# Patient Record
Sex: FEMALE | Race: WHITE | NOT HISPANIC OR LATINO | Employment: OTHER | URBAN - METROPOLITAN AREA
[De-identification: names, ages, dates, MRNs, and addresses within clinical notes are randomized per-mention and may not be internally consistent; named-entity substitution may affect disease eponyms.]

---

## 2017-01-05 ENCOUNTER — OFFICE VISIT (OUTPATIENT)
Dept: FAMILY MEDICINE | Facility: CLINIC | Age: 82
End: 2017-01-05

## 2017-01-05 VITALS
RESPIRATION RATE: 12 BRPM | OXYGEN SATURATION: 96 % | WEIGHT: 121 LBS | SYSTOLIC BLOOD PRESSURE: 122 MMHG | BODY MASS INDEX: 22.84 KG/M2 | TEMPERATURE: 97.3 F | DIASTOLIC BLOOD PRESSURE: 78 MMHG | HEART RATE: 91 BPM | HEIGHT: 61 IN

## 2017-01-05 DIAGNOSIS — Z23 NEEDS FLU SHOT: ICD-10-CM

## 2017-01-05 DIAGNOSIS — J20.9 ACUTE BRONCHITIS WITH SYMPTOMS > 10 DAYS: Primary | ICD-10-CM

## 2017-01-05 RX ORDER — AZITHROMYCIN 250 MG/1
TABLET, FILM COATED ORAL
Qty: 6 TABLET | Refills: 0 | Status: SHIPPED | OUTPATIENT
Start: 2017-01-05 | End: 2017-07-19

## 2017-01-05 NOTE — PROGRESS NOTES
"SUBJECTIVE:  1. Feeling ill:  -body aches over last 2 weeks  -just in last couple day had knees hurt  -coughing up lots of phlegm, hoarse voice last couple of days      ROS:  Possibly got ill exposure from family member, daughter was treated with abx. URI  Runny nose  Dry throat, coughing  Denies vomiting, does have diarrhea (painless and no blood)    OBJECTIVE:  /78 mmHg  Pulse 91  Temp(Src) 97.3  F (36.3  C) (Oral)  Resp 12  Ht 5' 0.5\" (153.7 cm)  Wt 121 lb (54.885 kg)  BMI 23.23 kg/m2  SpO2 96%  Gen: alert, oriented X 3, no acute distress, moving slightly slower, appears tired.  HEENT: TMs normal bilaterally, gray, normal light reflex, no significant cerumen, OP without erythema, no cervical lymphadenopathy  LUNGS: CTAB, no wheezing, no rales, no accessory muscle use, slightly coarse cough  COR: normal rate, regular rhythm and no murmurs, clicks, or gallops  -lower extremities : no edema, diffuse muscle pain         ASSESSMENT/PLAN:  1. Acute bronchitis with symptoms > 10 days  Given duration and age will add abx.  Discussed symptomatic treatment options and use of tylenol/advil  - azithromycin (ZITHROMAX) 250 MG tablet; Two tablets first day, then one tablet daily for four days.  Dispense: 6 tablet; Refill: 0    2. Needs flu shot  Agree with dosing now.  - ADMIN VACCINE, INITIAL  - Flu vaccine, high dose  .    "

## 2017-01-05 NOTE — MR AVS SNAPSHOT
"              After Visit Summary   2017    Marge Henry    MRN: [id number]           Patient Information     Date Of Birth          12/10/1934        Visit Information        Provider Department      2017 4:40 PM Ursula Quintanilla MD [facility]        Today's Diagnoses     Acute bronchitis with symptoms > 10 days    -  1        Follow-ups after your visit        Who to contact     Please call your clinic at [phone] to:    Ask questions about your health    Make or cancel appointments    Discuss your medicines    Learn about your test results    Speak to your doctor   If you have compliments or concerns about an experience at your clinic, or if you wish to file a complaint, please contact [facility] Patient Relations at [phone] or email us at [email or web address]         Additional Information About Your Visit        MyChart Information     [employer] is an electronic gateway that provides easy, online access to your medical records. With [employer], you can request a clinic appointment, read your test results, renew a prescription or communicate with your care team.     To sign up for [employer] visit the website at [email or web address]   You will be asked to enter the access code listed below, as well as some personal information. Please follow the directions to create your username and password.     Your access code is: [email or web address]  Expires: 2017  5:30 PM     Your access code will  in 90 days. If you need help or a new code, please contact your [facility] or call [phone] for assistance.        Care EveryWhere ID     This is your Care EveryWhere ID. This could be used by other organizations to access your [facility] medical records  [id number]        Your Vitals Were     Pulse Temperature Respirations Height BMI (Body Mass Index) Pulse Oximetry    91 97.3  F (36.3  C) (Oral) 12 5' 0.5\" (153.7 cm) " 23.23 kg/m2 96%       Blood Pressure from Last 3 Encounters:   01/05/17 122/78   03/02/16 154/85   10/30/15 132/81    Weight from Last 3 Encounters:   01/05/17 121 lb (54.885 kg)   03/02/16 118 lb 3.2 oz (53.615 kg)   10/30/15 122 lb 9.6 oz (55.611 kg)              Today, you had the following     No orders found for display         Today's Medication Changes          These changes are accurate as of: 1/5/17  5:30 PM.  If you have any questions, ask your nurse or doctor.               Start taking these medicines.        Dose/Directions    azithromycin 250 MG tablet   Commonly known as:  ZITHROMAX   Used for:  Acute bronchitis with symptoms > 10 days   Started by:  Ursula Quintanilla MD        Two tablets first day, then one tablet daily for four days.   Quantity:  6 tablet   Refills:  0            Where to get your medicines      These medications were sent to [id number] - [place], [place] - [address] AT [facility] & [address], [place] [address]     Phone:  [phone]    - azithromycin 250 MG tablet             Primary Care Provider Office Phone # Fax #    Ursula Quintanilla [phone]       [facility] [address] [place] [id number]        Thank you!     Thank you for choosing [facility]  for your care. Our goal is always to provide you with excellent care. Hearing back from our patients is one way we can continue to improve our services. Please take a few minutes to complete the written survey that you may receive in the mail after your visit with us. Thank you!             Your Updated Medication List - Protect others around you: Learn how to safely use, store and throw away your medicines at www.disposemymeds.org.          This list is accurate as of: 1/5/17  5:30 PM.  Always use your most recent med list.                   Brand Name Dispense Instructions for use    ACETAMINOPHEN      2 tablets. In  the morning and before bed as needed.       ALPRAZolam 0.25 MG tablet    XANAX    4 tablet    Take 1 tablet (0.25 mg) by mouth once as needed for anxiety (30 minutes before appointment)       aspirin 81 MG tablet      Take  by mouth daily.       azithromycin 250 MG tablet    ZITHROMAX    6 tablet    Two tablets first day, then one tablet daily for four days.       CALTRATE 600 PO          CENTRUM SILVER per tablet      Take 1 tablet by mouth daily       COSOPT PF 22.3-6.8 MG/ML Soln   Generic drug:  Dorzolamide HCl-Timolol Mal PF      Apply  to eye.       gabapentin 400 MG capsule    NEURONTIN    270 capsule    Take 1-2 capsules (400-800 mg) by mouth 2 times daily       lidocaine 5 % Patch    LIDODERM    60 patch    Apply up to 2 patches to painful area at once for up to 12 h within a 24 h period.  Remove after 12 hours.       lisinopril 20 MG tablet    PRINIVIL/ZESTRIL    90 tablet    Take 1 tablet (20 mg) by mouth daily       loperamide 2 MG tablet    IMODIUM A-D     Take 2 mg by mouth 4 times daily as needed.       XALATAN 0.005 % ophthalmic solution   Generic drug:  latanoprost      1 drop At Bedtime.

## 2017-01-17 ENCOUNTER — TELEPHONE (OUTPATIENT)
Dept: FAMILY MEDICINE | Facility: CLINIC | Age: 82
End: 2017-01-17

## 2017-01-17 NOTE — TELEPHONE ENCOUNTER
Spoke with Angela at [facility] of [facility]. Medication was not cover this month due to changes in insurance. Patient had paid cash ($155.29) for medication for January.       Prior Authorization needed on:  1/17/17     Medication:  GABAPENTIN Dose:  400 MG CAPSULE    SIG: TAKE 1-2 CAPSULES BY MOUTH TWICE DAILY    Pharmacy confirmed as   [id number] - [place], [place] - [address] AT [address] [place] [address]  Phone: [phone] Fax: [phone]  : Yes    Insurance Name:  [employer]  Insurance Phone: [phone]  Insurance Patient ID: [id number]    Alternatives Suggested:  NONE PROVIDED.    Berta Arias January 17, 2017 at 11:22 AM

## 2017-01-23 NOTE — TELEPHONE ENCOUNTER
Received response back from [facility] Jey BENITEZ and Appeals Dept - Appeal not handled by [facility].  Prior auth request only, not an appeal. Unsure if patient has an existing prior auth on file.  Spoke with EMMANUEL Zhou handled by a different department. Not a medicare patient.  Form changed. Need to complete and resubmit to insurance. Message forward to provider to complete.

## 2017-02-06 NOTE — TELEPHONE ENCOUNTER
Again, received information that PA is not handled by [facility].  I called and spoke with Insurance and prior auth is handled by medicare part d delta. Transferred call. [phone])  I spoke with Brenda and asked her to send prior auth form as this has been taken a lot of time trying to get prior auth completed for patient.  Per Brenda, had ran a test claim for 270 tabs per 30 days, no prior auth required. Unsure why I received prior auth request in the first place.  Nothing further needed.

## 2017-05-19 DIAGNOSIS — B02.29 NEURALGIA, POSTHERPETIC: ICD-10-CM

## 2017-05-19 RX ORDER — GABAPENTIN 400 MG/1
400-800 CAPSULE ORAL 2 TIMES DAILY
Qty: 270 CAPSULE | Refills: 3 | Status: SHIPPED | OUTPATIENT
Start: 2017-05-19 | End: 2017-07-19

## 2017-06-20 DIAGNOSIS — I10 ESSENTIAL HYPERTENSION, BENIGN: ICD-10-CM

## 2017-06-20 RX ORDER — LISINOPRIL 20 MG/1
20 TABLET ORAL DAILY
Qty: 90 TABLET | Refills: 3 | Status: SHIPPED | OUTPATIENT
Start: 2017-06-20 | End: 2018-06-11

## 2017-07-19 ENCOUNTER — OFFICE VISIT (OUTPATIENT)
Dept: FAMILY MEDICINE | Facility: CLINIC | Age: 82
End: 2017-07-19

## 2017-07-19 VITALS
TEMPERATURE: 97.6 F | BODY MASS INDEX: 24.26 KG/M2 | DIASTOLIC BLOOD PRESSURE: 79 MMHG | SYSTOLIC BLOOD PRESSURE: 136 MMHG | OXYGEN SATURATION: 96 % | RESPIRATION RATE: 16 BRPM | HEART RATE: 78 BPM | WEIGHT: 123.6 LBS | HEIGHT: 60 IN

## 2017-07-19 DIAGNOSIS — Z00.00 ROUTINE GENERAL MEDICAL EXAMINATION AT A HEALTH CARE FACILITY: Primary | ICD-10-CM

## 2017-07-19 DIAGNOSIS — Z23 IMMUNIZATION DUE: ICD-10-CM

## 2017-07-19 DIAGNOSIS — H61.22 IMPACTED CERUMEN OF LEFT EAR: ICD-10-CM

## 2017-07-19 DIAGNOSIS — B02.29 NEURALGIA, POSTHERPETIC: ICD-10-CM

## 2017-07-19 RX ORDER — GABAPENTIN 400 MG/1
400-800 CAPSULE ORAL 2 TIMES DAILY
Qty: 120 CAPSULE | Refills: 3 | Status: SHIPPED | OUTPATIENT
Start: 2017-07-19 | End: 2017-12-21

## 2017-07-19 NOTE — NURSING NOTE
DUE FOR:  Advance Directive Planning  Dexa Scan Screening  Tdap - may need to get at pharmacy due to insurance. - Pt wants to get this in clinic only and will pay for it if not cover by insurance.  PCV13 - pt okay for this today.    ABN signed today for Tdap per Medicare may not pay for vaccination done in clinic.

## 2017-07-19 NOTE — PROGRESS NOTES
Patient presents with:  Physical  Ear Problem: would like bilateral ears cleaned, having some hard time hearing in left ear. Seeing alot of ear wax from cleaning per daughter.  head problem: pt would like to know why sometimes when she turns her head, she gets dizzy,.  Refill Request: medication for shingles? 120 mg tab per pt    1.  One fall 6 months ago:  -turned fast and lost balance, now have a med alert  Bracelet  -wonders anything else for balance      ROS:  CONSTITUTIONAL: no fatigue, no unexpected change in weight  SKIN: no worrisome rashes, no worrisome moles, no worrisome lesions  EYES: no acute vision problems or changes  ENT: no ear problems, no mouth problems, no throat problems  RESP: no significant cough, no shortness of breath  CV: no chest pain, no palpitations, no new or worsening peripheral edema  GI: chronic unchanged loose bowels  : no frequency, no dysuria, no hematuria  NEURO: some balance issues see aboe  PSYCHIATRIC: NEGATIVE for changes in mood or affect      Patient Active Problem List   Diagnosis     Primary angle-closure glaucoma     Essential hypertension, benign     Irritable bowel syndrome     Osteoarthrosis, unspecified whether generalized or localized, pelvic region and thigh     Osteoarthrosis, unspecified whether generalized or localized, involving lower leg     Health Care Home     Shingles     Postherpetic neuralgia       Current Outpatient Prescriptions   Medication Sig Dispense Refill     lisinopril (PRINIVIL/ZESTRIL) 20 MG tablet Take 1 tablet (20 mg) by mouth daily 90 tablet 3     gabapentin (NEURONTIN) 400 MG capsule Take 1-2 capsules (400-800 mg) by mouth 2 times daily 270 capsule 3     ACETAMINOPHEN 2 tablets. In the morning and before bed as needed.       aspirin 81 MG tablet Take  by mouth daily.       Dorzolamide HCl-Timolol Mal PF (COSOPT PF) 22.3-6.8 MG/ML SOLN Apply  to eye.       latanoprost (XALATAN) 0.005 % ophthalmic solution 1 drop At Bedtime.        azithromycin (ZITHROMAX) 250 MG tablet Two tablets first day, then one tablet daily for four days. 6 tablet 0     lidocaine (LIDODERM) 5 % patch Apply up to 2 patches to painful area at once for up to 12 h within a 24 h period.  Remove after 12 hours. 60 patch 11     ALPRAZolam (XANAX) 0.25 MG tablet Take 1 tablet (0.25 mg) by mouth once as needed for anxiety (30 minutes before appointment) 4 tablet 1     Calcium Carbonate (CALTRATE 600 PO)        Multiple Vitamins-Minerals (CENTRUM SILVER) per tablet Take 1 tablet by mouth daily       loperamide (IMODIUM A-D) 2 MG tablet Take 2 mg by mouth 4 times daily as needed.         Past Medical History:   Diagnosis Date     Essential hypertension, benign 1/2/2013     Irritable bowel syndrome 1/2/2013     Osteoarthrosis, unspecified whether generalized or localized, pelvic region and thigh 1/2/2013     Prim angle-clos glauc 1/2/2013        Family History        Negative family history of: CANCER, DIABETES, HEART DISEASE, Coronary Artery Disease          Problem List Medication List and Allergy List were reviewed.    Patient is an established patient of this clinic..    Social History   Substance Use Topics     Smoking status: Former Smoker     Smokeless tobacco: Never Used     Alcohol use 0.0 oz/week     0 Standard drinks or equivalent per week      Comment: 1-2 cans of beer/Wine Occasionally       Children ? yes 3    Has anyone hurt you physically, for example by pushing, hitting, slapping or kicking you or forcing you to have sex? Denies  Do you feel threatened or controlled by a partner, ex-partner or anyone in your life? Denies    RISK BEHAVIORS AND HEALTHY HABITS:  Tobacco Use/Smoking: None  Illicit Drug Use: None  Do you use alcohol? Yes has a beer daily sometimes  Diet (5-7 servings of fruits/veg daily): Yes   Exercise (30 min accumulated most days):discussed balance exercises  Dental Care: Yes   Calcium 1500 mg/d:  Yes  Seat Belt Use: Yes         Immunization  History   Administered Date(s) Administered     Influenza (High Dose) 3 valent vaccine 01/05/2017     Influenza (IIV3) 02/07/2011     Pneumococcal 23 valent 04/18/2006     Tdap (Adacel,Boostrix) 04/18/2006    Reviewed Immunization Record Today    EXAMINATION:   /79 (BP Location: Right arm)  Pulse 78  Temp 97.6  F (36.4  C) (Oral)  Resp 16  Ht 5' (152.4 cm)  Wt 123 lb 9.6 oz (56.1 kg)  SpO2 96%  BMI 24.14 kg/m2  GENERAL: healthy, alert and no distress  EYES: Eyes grossly normal to inspection, extraocular movements - intact, and PERRL  HENT: ear canals- normal; TMs- normal; Nose- normal; Mouth- no ulcers, no lesions  NECK: no tenderness, no adenopathy, no asymmetry, no masses, no stiffness; thyroid- normal to palpation  RESP: lungs clear to auscultation - no rales, no rhonchi, no wheezes  BREAST: no masses, no tenderness, no nipple discharge, no palpable axillary masses or adenopathy  CV: regular rates and rhythm, normal S1 S2, no S3 or S4 and no murmur, no click or rub -  ABDOMEN: soft, no tenderness, no  hepatosplenomegaly, no masses, normal bowel sounds  MS: extremities- no gross deformities noted, no edema  SKIN: no suspicious lesions, no rashes  NEURO: strength and tone- normal, sensory exam- grossly normal, mentation- intact, speech- normal, reflexes- symmetric  BACK: no CVA tenderness, no paralumbar tenderness  PSYCH: Alert and oriented times 3; speech- coherent , normal rate and volume; able to articulate logical thoughts, able to abstract reason, no tangential thoughts, no hallucinations or delusions, affect- normal  LYMPHATICS: ant. cervical- normal, post. cervical- normal, axillary- normal, supraclavicular- normal, inguinal- normal    ASSESSMENT:  1. Health Care Maintenance: Normal Physical Exam    PLAN:  (Z00.00) Routine general medical examination at a health care facility  (primary encounter diagnosis)  Comment: continue current healthy habits  Plan: encourage ongoing home safety    (B02.29)  Neuralgia, postherpetic  Comment: stable, improving after many years  Plan: gabapentin (NEURONTIN) 400 MG capsule        Taking as needed    (Z23) Immunization due  Comment: agrees to vaccine  Plan: ADMIN VACCINE, INITIAL, ADMIN VACCINE, EACH         ADDITIONAL, TDAP VACCINE (BOOSTRIX),         Pneumococcal vaccine 13 valent PCV13 IM         (Prevnar) [74405]

## 2017-07-19 NOTE — PATIENT INSTRUCTIONS
Everyday work on your balance exercises as shown in clinic.      Your medication list is printed, please keep this with you, it is helpful to bring this current list to any other medical appointments, the emergency room or hospital.    If you had lab testing today and your results are reassuring or normal they will be be mailed to you within 7 days.     If the lab tests need quick action we will call you with the results.   The phone number we will call with results is # [phone] (home) . If this is not the best number please call our clinic and change the number.    If you need any refills please call your pharmacy and they will contact us.    If you have any further concerns or wish to schedule another appointment you must call our office during normal business hours  [phone] (8-5:00 M-F)  If you have urgent medical questions that cannot wait  you may also call [phone] at any time of day.  If you have a medical emergency please call 911.    Thank you for coming to [facility].      Preventive Health Recommendations  Female Ages 65 +    Yearly exam:     See your health care provider every year in order to  o Review health changes.   o Discuss preventive care.    o Review your medicines if your doctor has prescribed any.      You no longer need a yearly Pap test unless you've had an abnormal Pap test in the past 10 years. If you have vaginal symptoms, such as bleeding or discharge, be sure to talk with your provider about a Pap test.      Every 1 to 2 years, have a mammogram.  If you are over 69, talk with your health care provider about whether or not you want to continue having screening mammograms.      Every 10 years, have a colonoscopy. Or, have a yearly FIT test (stool test). These exams will check for colon cancer.       Have a cholesterol test every 5 years, or more often if your doctor advises it.       Have a diabetes test (fasting glucose) every three years. If you are at risk for  diabetes, you should have this test more often.       At age 65, have a bone density scan (DEXA) to check for osteoporosis (brittle bone disease).    Shots:    Get a flu shot each year.    Get a tetanus shot every 10 years.    Talk to your doctor about your pneumonia vaccines. There are now two you should receive - Pneumovax (PPSV 23) and Prevnar (PCV 13).    Talk to your doctor about the shingles vaccine.    Talk to your doctor about the hepatitis B vaccine.    Nutrition:     Eat at least 5 servings of fruits and vegetables each day.      Eat whole-grain bread, whole-wheat pasta and brown rice instead of white grains and rice.      Talk to your provider about Calcium and Vitamin D.     Lifestyle    Exercise at least 150 minutes a week (30 minutes a day, 5 days a week). This will help you control your weight and prevent disease.      Limit alcohol to one drink per day.      No smoking.       Wear sunscreen to prevent skin cancer.       See your dentist twice a year for an exam and cleaning.      See your eye doctor every 1 to 2 years to screen for conditions such as glaucoma, macular degeneration, cataracts, etc

## 2017-07-19 NOTE — MR AVS SNAPSHOT
After Visit Summary   7/19/2017    Marge Henry    MRN: [id number]           Patient Information     Date Of Birth          12/10/1934        Visit Information        Provider Department      7/19/2017 1:00 PM Ursula Quintanilla MD [facility]        Today's Diagnoses     Routine general medical examination at a health care facility    -  1    Neuralgia, postherpetic        Immunization due          Care Instructions    Everyday work on your balance exercises as shown in clinic.      Your medication list is printed, please keep this with you, it is helpful to bring this current list to any other medical appointments, the emergency room or hospital.    If you had lab testing today and your results are reassuring or normal they will be be mailed to you within 7 days.     If the lab tests need quick action we will call you with the results.   The phone number we will call with results is # [phone] (home) . If this is not the best number please call our clinic and change the number.    If you need any refills please call your pharmacy and they will contact us.    If you have any further concerns or wish to schedule another appointment you must call our office during normal business hours  [phone] (8-5:00 M-F)  If you have urgent medical questions that cannot wait  you may also call [phone] at any time of day.  If you have a medical emergency please call [phone].    Thank you for coming to [facility].      Preventive Health Recommendations  Female Ages 65 +    Yearly exam:     See your health care provider every year in order to  o Review health changes.   o Discuss preventive care.    o Review your medicines if your doctor has prescribed any.      You no longer need a yearly Pap test unless you've had an abnormal Pap test in the past 10 years. If you have vaginal symptoms, such as bleeding or discharge, be sure to talk with your provider about a Pap test.      Every 1  to 2 years, have a mammogram.  If you are over 69, talk with your health care provider about whether or not you want to continue having screening mammograms.      Every 10 years, have a colonoscopy. Or, have a yearly FIT test (stool test). These exams will check for colon cancer.       Have a cholesterol test every 5 years, or more often if your doctor advises it.       Have a diabetes test (fasting glucose) every three years. If you are at risk for diabetes, you should have this test more often.       At age 65, have a bone density scan (DEXA) to check for osteoporosis (brittle bone disease).    Shots:    Get a flu shot each year.    Get a tetanus shot every 10 years.    Talk to your doctor about your pneumonia vaccines. There are now two you should receive - Pneumovax (PPSV 23) and Prevnar (PCV 13).    Talk to your doctor about the shingles vaccine.    Talk to your doctor about the hepatitis B vaccine.    Nutrition:     Eat at least 5 servings of fruits and vegetables each day.      Eat whole-grain bread, whole-wheat pasta and brown rice instead of white grains and rice.      Talk to your provider about Calcium and Vitamin D.     Lifestyle    Exercise at least 150 minutes a week (30 minutes a day, 5 days a week). This will help you control your weight and prevent disease.      Limit alcohol to one drink per day.      No smoking.       Wear sunscreen to prevent skin cancer.       See your dentist twice a year for an exam and cleaning.      See your eye doctor every 1 to 2 years to screen for conditions such as glaucoma, macular degeneration, cataracts, etc           Follow-ups after your visit        Who to contact     Please call your clinic at [phone] to:    Ask questions about your health    Make or cancel appointments    Discuss your medicines    Learn about your test results    Speak to your doctor   If you have compliments or concerns about an experience at your clinic, or if you wish to file a  complaint, please contact [facility] Patient Relations at [phone] or email us at [email or web address]         Additional Information About Your Visit        "[employer]har[employer]IQ Information     [employer] is an electronic gateway that provides easy, online access to your medical records. With [employer], you can request a clinic appointment, read your test results, renew a prescription or communicate with your care team.     To sign up for [employer] visit the website at [email or web address]   You will be asked to enter the access code listed below, as well as some personal information. Please follow the directions to create your username and password.     Your access code is: [email or web address]  Expires: 10/17/2017  1:57 PM     Your access code will  in 90 days. If you need help or a new code, please contact your [facility] or call [phone] for assistance.        Care EveryWhere ID     This is your Care EveryWhere ID. This could be used by other organizations to access your [facility] medical records  [id number]        Your Vitals Were     Pulse Temperature Respirations Height Pulse Oximetry BMI (Body Mass Index)    78 97.6  F (36.4  C) (Oral) 16 5' (152.4 cm) 96% 24.14 kg/m2       Blood Pressure from Last 3 Encounters:   17 136/79   17 122/78   16 154/85    Weight from Last 3 Encounters:   17 123 lb 9.6 oz (56.1 kg)   17 121 lb (54.9 kg)   16 118 lb 3.2 oz (53.6 kg)              We Performed the Following     ADMIN VACCINE, EACH ADDITIONAL     ADMIN VACCINE, INITIAL     Pneumococcal vaccine 13 valent PCV13 IM (Prevnar) [89446]     TDAP VACCINE (BOOSTRIX)          Today's Medication Changes          These changes are accurate as of: 17  1:57 PM.  If you have any questions, ask your nurse or doctor.               Stop taking these medicines if you haven't already. Please contact your care team if you have  questions.     ALPRAZolam 0.25 MG tablet   Commonly known as:  XANAX   Stopped by:  Ursula Quintanilla MD           azithromycin 250 MG tablet   Commonly known as:  ZITHROMAX   Stopped by:  Ursula Quintanilla MD           CENTRUM SILVER per tablet   Stopped by:  Ursula Quintanilla MD                Where to get your medicines      These medications were sent to [id number] - [place], [place] - [address]  [address], [place] [address]     Phone:  [phone]     gabapentin 400 MG capsule                Primary Care Provider Office Phone # Fax #    Ursula Quintanilla [phone]       [facility] [address] [address]        Equal Access to Services     GUY WEBB : Hadii tim ku hadasho Soomaali, waaxda luqadaha, qaybta kaalmada adeegyada, waxay idiin haychandrika cook . So [facility] [phone].    ATENCIÓN: Si habla español, tiene a le disposición servicios gratuitos de asistencia lingüística. [facility] [phone].    We comply with applicable federal civil rights laws and [place] laws. We do not discriminate on the basis of race, color, national origin, age, disability sex, sexual orientation or gender identity.            Thank you!     Thank you for choosing [facility]  for your care. Our goal is always to provide you with excellent care. Hearing back from our patients is one way we can continue to improve our services. Please take a few minutes to complete the written survey that you may receive in the mail after your visit with us. Thank you!             Your Updated Medication List - Protect others around you: Learn how to safely use, store and throw away your medicines at www.disposemymeds.org.          This list is accurate as of: 7/19/17  1:57 PM.  Always use your most recent med list.                   Brand Name Dispense Instructions for use Diagnosis    ACETAMINOPHEN      2 tablets. In the morning and before  bed as needed.        aspirin 81 MG tablet      Take  by mouth daily.    Arthritis       CALTRATE 600 PO           COSOPT PF 22.3-6.8 MG/ML Soln   Generic drug:  Dorzolamide HCl-Timolol Mal PF      Apply  to eye.    Arthritis       gabapentin 400 MG capsule    NEURONTIN    120 capsule    Take 1-2 capsules (400-800 mg) by mouth 2 times daily    Neuralgia, postherpetic       lidocaine 5 % Patch    LIDODERM    60 patch    Apply up to 2 patches to painful area at once for up to 12 h within a 24 h period.  Remove after 12 hours.    Postherpetic neuralgia       lisinopril 20 MG tablet    PRINIVIL/ZESTRIL    90 tablet    Take 1 tablet (20 mg) by mouth daily    Essential hypertension, benign       loperamide 2 MG tablet    IMODIUM A-D     Take 2 mg by mouth 4 times daily as needed.    Arthritis       XALATAN 0.005 % ophthalmic solution   Generic drug:  latanoprost      1 drop At Bedtime.    Arthritis

## 2017-12-21 DIAGNOSIS — B02.29 NEURALGIA, POSTHERPETIC: ICD-10-CM

## 2017-12-21 RX ORDER — GABAPENTIN 400 MG/1
400-800 CAPSULE ORAL 2 TIMES DAILY
Qty: 120 CAPSULE | Refills: 3 | Status: SHIPPED | OUTPATIENT
Start: 2017-12-21 | End: 2018-07-19

## 2017-12-21 NOTE — TELEPHONE ENCOUNTER
[facility] phone call message- patient requesting a refill:    Full Medication Name: gabapentin    Dose: 400mg    Pharmacy confirmed as   [id number] - [place], [place] - [address] AT [facility]  [address] [place] [address]  Phone: [phone] Fax: [phone]    [facility] - [place], [place] - [address]  [address] [place] [address]  Phone: [phone] Fax: [phone]  : Yes    Additional Comments: Patient needs a new Rx for this.      OK to leave a message on voice mail? Yes    Primary language: English      needed? No    Call taken on December 21, 2017 at 10:45 AM by Milagros Hopkins

## 2018-01-16 ENCOUNTER — HOSPITAL ENCOUNTER (OUTPATIENT)
Dept: MAMMOGRAPHY | Facility: HOSPITAL | Age: 83
Discharge: HOME | End: 2018-01-16
Attending: FAMILY MEDICINE

## 2018-01-16 ENCOUNTER — TRANSFERRED RECORDS (OUTPATIENT)
Dept: HEALTH INFORMATION MANAGEMENT | Facility: CLINIC | Age: 83
End: 2018-01-16

## 2018-01-16 DIAGNOSIS — Z12.31 VISIT FOR SCREENING MAMMOGRAM: ICD-10-CM

## 2018-06-11 DIAGNOSIS — I10 ESSENTIAL HYPERTENSION, BENIGN: ICD-10-CM

## 2018-06-13 RX ORDER — LISINOPRIL 20 MG/1
20 TABLET ORAL DAILY
Qty: 90 TABLET | Refills: 0 | Status: SHIPPED | OUTPATIENT
Start: 2018-06-13 | End: 2018-09-21

## 2018-07-19 DIAGNOSIS — B02.29 NEURALGIA, POSTHERPETIC: ICD-10-CM

## 2018-07-19 RX ORDER — GABAPENTIN 400 MG/1
400-800 CAPSULE ORAL 2 TIMES DAILY
Qty: 180 CAPSULE | Refills: 3 | Status: SHIPPED | OUTPATIENT
Start: 2018-07-19 | End: 2019-03-29

## 2018-07-25 ENCOUNTER — OFFICE VISIT (OUTPATIENT)
Dept: FAMILY MEDICINE | Facility: CLINIC | Age: 83
End: 2018-07-25
Payer: COMMERCIAL

## 2018-07-25 VITALS
HEIGHT: 59 IN | WEIGHT: 113 LBS | TEMPERATURE: 98.5 F | HEART RATE: 87 BPM | SYSTOLIC BLOOD PRESSURE: 129 MMHG | BODY MASS INDEX: 22.78 KG/M2 | DIASTOLIC BLOOD PRESSURE: 70 MMHG | OXYGEN SATURATION: 94 %

## 2018-07-25 DIAGNOSIS — Z00.00 WELLNESS EXAMINATION: Primary | ICD-10-CM

## 2018-07-25 DIAGNOSIS — Z87.440 PERSONAL HISTORY OF URINARY TRACT INFECTION: ICD-10-CM

## 2018-07-25 NOTE — NURSING NOTE
Medicare Wellness Visit  Health Risk Assessment           Health Risk Assessment / Review of Systems     Constitutional: Any fevers or night sweats? No     Eyes:  Vision problems   No- sees Opthalmologist every 2 months. 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    Hearing Do you feel you have hearing loss?  No     Cardiovascular: Any chest pain, fast or irregular heart beat, calf pain with walking?     No           Respiratory:   Any breathing problems or cough?   No     Gastrointestinal: Any stomach or stool problems?   No - no concerns, take Imodium daily to stay regular.    Genitourinary: Do you have difficulty controlling urination?   No - currently does not have any symptoms or concerns. Would like to request for antibiotics to have on hand in case she becomes symptomatic. Hx- last year had 3 UTIs.    Muscles and Joints: Any joint stiffness or soreness?   No     Skin: Any concerning lesions or moles?   No     Nervous System: Any loss of strength or feeling, numbness or tingling, shaking, dizziness, or headache?  No     Mental Health: Any depression, anxiety or problems sleeping?    No     Cognition: Do you have any problems with your memory?  No            Medical Care     What other specialists or organizations are involved in your medical care?  Opthalmologist  Patient Care Team       Relationship Specialty Notifications Start End    Ursula Quintanilla MD PCP - General Family Practice  12/11/12     Phone: [phone] Fax: [phone]         [facility] [place] [address]          Have you been to the ER or overnight in the hospital in the last year?  No          Social History / Home Safety       Marital Status:  Who lives in your household?  Lives with daughter Margaux and Zulyn trae.    Do you feel threatened or controlled by a partner, ex-partner or anyone in your life? No     Has anyone hurt you physically, for example by pushing, hitting, slapping or kicking you   or forcing you to have sex? No           Does your home have any of the following safety concerns; loose rugs in the hallway,  bathrooms with no grab bars by the tub or toilet, stairs with no handrails or poorly lit areas?  No     Do you need help with dressing yourself, bathing, eating or getting around your home?  No     Do you need help with the phone, transportation, shopping, preparing meals, housework, laundry, medications or managing money? YES - Marge folds clothes but daughter will wash/dry her clothes. Washer and dryer is located down in basement. Daughter had mentioned relocating washer and dryer to be upstairs, easier for use.      Risk Behaviors and Healthy Habits     History   Smoking Status     Former Smoker   Smokeless Tobacco     Never Used     How many servings of fruits and vegetables do you eat a day? 2-3 servings a day. Advised and encouraged to try to increase intake to 5 servings a day. Marge states she is not concerned as she takes her vitamins.    Exercise: keeps self busy walking in yard and house only, uses a cane as assistive device for walking. Otherwise no routine.     Do you frequently drive without a seatbelt? No     Do you use tobacco?  No     Do you use any other drugs? No         Do you use alcohol?Yes  Number of drinks per day - one  Number of drinking days a week -  7 days a week around 4-5 pm with dinner.      Frailty Assessment            Have you lost 10 or more pounds unintentionally in the previous year? No     How difficult is walking from one room to the other on the same level? not       Is it difficult to lift or carry something as heavy as 10 pounds? Severely- daughter no longer will allow Marge to lift or carry items.      Compared with most (men/women) your age, would you say  that you are more active, less active, or about the same?  same        FALL RISK ASSESSMENT 7/25/2018   Fallen 2 or more times in the past year? No   Any fall with injury in the past year? No     TImed up and go test- 15  seconds    Advance Directives:   Discussed with patient and family as appropriate. Has patient  completed advance directives and/or a living will? yes      Sonal Barakat RN

## 2018-07-25 NOTE — MR AVS SNAPSHOT
After Visit Summary   2018    Marge Henry    MRN: [id number]           Patient Information     Date Of Birth          12/10/1934        Visit Information        Provider Department      2018 2:40 PM Rn, Triston Ump [facility]        Today's Diagnoses     Wellness examination    -  1       Follow-ups after your visit        Who to contact     Please call your clinic at [phone] to:    Ask questions about your health    Make or cancel appointments    Discuss your medicines    Learn about your test results    Speak to your doctor            Additional Information About Your Visit        MyChart Information     "[employer] is an electronic gateway that provides easy, online access to your medical records. With "[employer] you can request a clinic appointment, read your test results, renew a prescription or communicate with your care team.     To sign up for "[employer] visit the website at [email or web address]   You will be asked to enter the access code listed below, as well as some personal information. Please follow the directions to create your username and password.     Your access code is: [email or web address]  Expires: 10/23/2018  3:06 PM     Your access code will  in 90 days. If you need help or a new code, please contact your [facility] or call [phone] for assistance.        Care EveryWhere ID     This is your Care EveryWhere ID. This could be used by other organizations to access your [facility] medical records  [id number]         Blood Pressure from Last 3 Encounters:   18 129/70   17 136/79   17 122/78    Weight from Last 3 Encounters:   18 113 lb (51.3 kg)   17 123 lb 9.6 oz (56.1 kg)   17 121 lb (54.9 kg)              Today, you had the following     No orders found for display         Today's Medication Changes          These changes are accurate as of 18  3:06 PM.  If you have any questions, ask  your nurse or doctor.               These medicines have changed or have updated prescriptions.        Dose/Directions    lidocaine 5 % Patch   Commonly known as:  LIDODERM   This may have changed:    - how much to take  - when to take this  - reasons to take this  - additional instructions   Used for:  Postherpetic neuralgia        Apply up to 2 patches to painful area at once for up to 12 h within a 24 h period.  Remove after 12 hours.   Quantity:  60 patch   Refills:  11                Primary Care Provider Office Phone # Fax #    Ursula Quintanilla [phone]       [facility] [place] [address]        Equal Access to Services     [facility]: Hadii tim newsome hadasho Soomaali, waaxda luqadaha, qaybta kaalmakianna coleman, eve cook . So [facility] [phone].    ATENCIÓN: Si habla español, tiene a le disposición servicios gratuitos de asistencia lingüística. [facility] [phone].    We comply with applicable federal civil rights laws and [place] laws. We do not discriminate on the basis of race, color, national origin, age, disability, sex, sexual orientation, or gender identity.            Thank you!     Thank you for choosing [facility]  for your care. Our goal is always to provide you with excellent care. Hearing back from our patients is one way we can continue to improve our services. Please take a few minutes to complete the written survey that you may receive in the mail after your visit with us. Thank you!             Your Updated Medication List - Protect others around you: Learn how to safely use, store and throw away your medicines at www.disposemymeds.org.          This list is accurate as of 7/25/18  3:06 PM.  Always use your most recent med list.                   Brand Name Dispense Instructions for use Diagnosis    ACETAMINOPHEN      2 tablets. In the morning and before bed as needed.        aspirin 81 MG tablet      Take  by  mouth daily.    Arthritis       CALTRATE 600 PO           COSOPT PF 22.3-6.8 MG/ML opthalmic solution   Generic drug:  dorzolamide-timolol PF      Apply  to eye.    Arthritis       gabapentin 400 MG capsule    NEURONTIN    180 capsule    Take 1-2 capsules (400-800 mg) by mouth 2 times daily    Neuralgia, postherpetic       lidocaine 5 % Patch    LIDODERM    60 patch    Apply up to 2 patches to painful area at once for up to 12 h within a 24 h period.  Remove after 12 hours.    Postherpetic neuralgia       lisinopril 20 MG tablet    PRINIVIL/ZESTRIL    90 tablet    Take 1 tablet (20 mg) by mouth daily    Essential hypertension, benign       loperamide 2 MG tablet    IMODIUM A-D     Take 2 mg by mouth daily as needed    Arthritis       XALATAN 0.005 % ophthalmic solution   Generic drug:  latanoprost      1 drop At Bedtime.    Arthritis

## 2018-07-25 NOTE — PATIENT INSTRUCTIONS
PERSONAL PREVENTIVE SERVICES PLAN - SERVICES     Review these tests with your medical staff then decide which ones you want and take this page home for your reference      SCREENING TESTS     Description   Year of Last Screening   Recommended Today?   Heart disease screening blood tests    Cholesterol level Reducing cholesterol can reduce your risk of heart attacks by 25%.  Screening is recommended yearly if you are at risk of heart disease otherwise every 4-5 years 4/10/2013 Yes; Recommended    Diabetes screening tests    Hemoglobin A1c blood test   Finding and treating diabetes early can reduce complications.  Screening recommended/covered yearly if you have high blood pressure, high cholesterol, obesity (BMI >30), or a history of high blood glucose tests; or 2 of the following: family history of diabetes, overweight (BMI >25 but <30), age 65 years or older, and a history of diabetes of pregnancy or gave birth to baby weighing more than 9 lbs.  Yes; Recommended    Hepatitis B screening Finding hepatitis B early can reduce complications.  Screening is recommended for persons with selected risk factors.  No: is not indicated today.   Hepatitis C screening Finding hepatitis C early can reduce complications.  Screening is recommended for all persons born from 1945 through 1965 and for those with selected other risk factors.   No: is not indicated today.   HIV screening Finding HIV early can reduce complications.  Screening is recommended for persons with risk factors for HIV infection.  No: is not indicated today.   Glaucoma screening Early detection of glaucoma can prevent blindness.   Please talk to your eye doctor about this.       SCREENING TESTS     Description   Year of Last Screening   Recommended Today?   Colorectal cancer screening    Fecal occult blood test     Screening colonoscopy Screening for colon cancer has been shown to reduce death from colon cancer by 25-30%. Screening recommended to start at 50  years and continuing until age 75 years.    Would like to request FIT to complete screening first.   Breast Cancer Screening (women)    Mammogram Mammogram screening for breast cancer has been shown to reduce the risk of dying from breast cancer and prolong life. Screening is recommended every 1-2 years for women aged 50 to 74 years.  1/19/2018 No; is up to date.   Cervical Cancer screening (women)    Pap Cervical pap smears can reduce cervical cancer. Screening is recommended annually if high risk (history of abnormal pap smears) otherwise every 2-3 years, stop screening at 65 years of age if history of normal paps.  No: is not indicated today.   Screening for Osteoporosis:  Bone mass measurements (women)    Dexa Scan Screening and treating Osteoporosis can reduce the risk of hip and spine fractures. Screening is recommended in women 65 years or older and in women and men at risk of osteoporosis.  Yes; Recommended    Screening for Lung Cancer     Low-dose CT scanning Screening can reduce mortality in persons aged 55-80 who have smoked at least 30 pack-years and who are either still smoking or have quit in the past 15 years.  No: is not indicated today.   Abdominal Aortic Aneurysm (AAA) screening    Ultrasound (US)   An aneurysm treated before rupture is very safe -a ruptured aneurysm can be fatal.  Screening  by US for AAA is limited to patients who meet one of the following criteria:    Men who are 65-75 years old and have smoked more than 100 cigarettes in their lifetime    Anyone with a family history of abdominal aortic aneurysm  No: is not indicated today.     Here are your recommended immunizations.  Take this home for your reference.                                                    IMMUNIZATIONS Description Recommend today?     Influenza (Flu shot) Prevents flu; should get every year No; is up to date.   PCV 13 Pneumonia vaccination; you get it once No; is up to date.   PPSV 23 Second pneumonia  vaccination; usually get it 1 year after PCV 13 No; is up to date.   Zoster (Shingles) Prevents shingles; you get it once  (Check with Part D insurance for coverage, must receive at a pharmacy, not clinic) Yes; Recommended    Tetanus Prevents tetanus; once every 10 years No; is up to date.     Hepatitis B  If you have any of the following risk factors you should be immunized for hepatitis B: severe kidney disease, people who live in the same house as a carrier of Hepatitis B virus, people who live in  institutions (e.g. nursing homes or group homes), homosexual men, patients with hemophilia who received Factor VIII or IX concentrates, abusers of illicit injectable drugs No: is not indicated today.      PATIENT INSTRUCTIONS    Yearly exam:     See your health care provider every year in order to review changes in your health, review medicines that you take, and discuss preventive care needs such as immunizations and cancer screening.    Get a flu shot each year.     Advance Directives:    If you have not done so, you are encouraged to complete advance directives and/or a living will.   More information about advance directives can be found at: http://www.[place]med.org/advocacy/Key-Issues/Advance-Directives    Nutrition:     Eat at least 5 servings of fruits and vegetables each day.     Eat whole-grain bread, whole-wheat pasta and brown rice instead of white grains and rice.     Talk to your doctor about Calcium and Vitamin D.     Lifestyle:    Exercise for at least 150 minutes a week (30 minutes a day, 5 days a week). This will help you control your weight and prevent disease.     Limit alcohol to one drink per day.     If you smoke, try to quit - your doctor will be happy to help.     Wear sunscreen to prevent skin cancer.     See your dentist every six months for an exam and cleaning.     See your eye doctor every 1 to 2 years to screen for conditions such as glaucoma, macular degeneration and  cataracts.

## 2018-07-25 NOTE — MR AVS SNAPSHOT
After Visit Summary   7/25/2018    Marge Henry    MRN: [id number]           Patient Information     Date Of Birth          12/10/1934        Visit Information        Provider Department      7/25/2018 3:00 PM Ursula Quintanilla MD [facility]        Today's Diagnoses     Personal history of urinary tract infection          Care Instructions     PERSONAL PREVENTIVE SERVICES PLAN - SERVICES     Review these tests with your medical staff then decide which ones you want and take this page home for your reference      SCREENING TESTS     Description   Year of Last Screening   Recommended Today?   Heart disease screening blood tests    Cholesterol level Reducing cholesterol can reduce your risk of heart attacks by 25%.  Screening is recommended yearly if you are at risk of heart disease otherwise every 4-5 years 4/10/2013 Yes; Recommended    Diabetes screening tests    Hemoglobin A1c blood test   Finding and treating diabetes early can reduce complications.  Screening recommended/covered yearly if you have high blood pressure, high cholesterol, obesity (BMI >30), or a history of high blood glucose tests; or 2 of the following: family history of diabetes, overweight (BMI >25 but <30), age 65 years or older, and a history of diabetes of pregnancy or gave birth to baby weighing more than 9 lbs.  Yes; Recommended    Hepatitis B screening Finding hepatitis B early can reduce complications.  Screening is recommended for persons with selected risk factors.  No: is not indicated today.   Hepatitis C screening Finding hepatitis C early can reduce complications.  Screening is recommended for all persons born from 1945 through 1965 and for those with selected other risk factors.   No: is not indicated today.   HIV screening Finding HIV early can reduce complications.  Screening is recommended for persons with risk factors for HIV infection.  No: is not indicated today.   Glaucoma screening Early  detection of glaucoma can prevent blindness.   Please talk to your eye doctor about this.       SCREENING TESTS     Description   Year of Last Screening   Recommended Today?   Colorectal cancer screening    Fecal occult blood test     Screening colonoscopy Screening for colon cancer has been shown to reduce death from colon cancer by 25-30%. Screening recommended to start at 50 years and continuing until age 75 years.    Would like to request FIT to complete screening first.   Breast Cancer Screening (women)    Mammogram Mammogram screening for breast cancer has been shown to reduce the risk of dying from breast cancer and prolong life. Screening is recommended every 1-2 years for women aged 50 to 74 years.  1/19/2018 No; is up to date.   Cervical Cancer screening (women)    Pap Cervical pap smears can reduce cervical cancer. Screening is recommended annually if high risk (history of abnormal pap smears) otherwise every 2-3 years, stop screening at 65 years of age if history of normal paps.  No: is not indicated today.   Screening for Osteoporosis:  Bone mass measurements (women)    Dexa Scan Screening and treating Osteoporosis can reduce the risk of hip and spine fractures. Screening is recommended in women 65 years or older and in women and men at risk of osteoporosis.  Yes; Recommended    Screening for Lung Cancer     Low-dose CT scanning Screening can reduce mortality in persons aged 55-80 who have smoked at least 30 pack-years and who are either still smoking or have quit in the past 15 years.  No: is not indicated today.   Abdominal Aortic Aneurysm (AAA) screening    Ultrasound (US)   An aneurysm treated before rupture is very safe -a ruptured aneurysm can be fatal.  Screening  by US for AAA is limited to patients who meet one of the following criteria:    Men who are 65-75 years old and have smoked more than 100 cigarettes in their lifetime    Anyone with a family history of abdominal aortic aneurysm  No: is  not indicated today.     Here are your recommended immunizations.  Take this home for your reference.                                                    IMMUNIZATIONS Description Recommend today?     Influenza (Flu shot) Prevents flu; should get every year No; is up to date.   PCV 13 Pneumonia vaccination; you get it once No; is up to date.   PPSV 23 Second pneumonia vaccination; usually get it 1 year after PCV 13 No; is up to date.   Zoster (Shingles) Prevents shingles; you get it once  (Check with Part D insurance for coverage, must receive at a pharmacy, not clinic) Yes; Recommended    Tetanus Prevents tetanus; once every 10 years No; is up to date.     Hepatitis B  If you have any of the following risk factors you should be immunized for hepatitis B: severe kidney disease, people who live in the same house as a carrier of Hepatitis B virus, people who live in  institutions (e.g. nursing homes or group homes), homosexual men, patients with hemophilia who received Factor VIII or IX concentrates, abusers of illicit injectable drugs No: is not indicated today.      PATIENT INSTRUCTIONS    Yearly exam:     See your health care provider every year in order to review changes in your health, review medicines that you take, and discuss preventive care needs such as immunizations and cancer screening.    Get a flu shot each year.     Advance Directives:    If you have not done so, you are encouraged to complete advance directives and/or a living will.   More information about advance directives can be found at: http://www.mnmed.org/advocacy/Key-Issues/Advance-Directives    Nutrition:     Eat at least 5 servings of fruits and vegetables each day.     Eat whole-grain bread, whole-wheat pasta and brown rice instead of white grains and rice.     Talk to your doctor about Calcium and Vitamin D.     Lifestyle:    Exercise for at least 150 minutes a week (30 minutes a day, 5 days a week). This will help you control your weight  "and prevent disease.     Limit alcohol to one drink per day.     If you smoke, try to quit - your doctor will be happy to help.     Wear sunscreen to prevent skin cancer.     See your dentist every six months for an exam and cleaning.     See your eye doctor every 1 to 2 years to screen for conditions such as glaucoma, macular degeneration and cataracts.                                    Follow-ups after your visit        Who to contact     Please call your clinic at [phone] to:    Ask questions about your health    Make or cancel appointments    Discuss your medicines    Learn about your test results    Speak to your doctor            Additional Information About Your Visit        [employer] Information     In[employer]ia is an electronic gateway that provides easy, online access to your medical records. With In[employer]ia, you can request a clinic appointment, read your test results, renew a prescription or communicate with your care team.     To sign up for In[employer]ia visit the website at [email or web address]   You will be asked to enter the access code listed below, as well as some personal information. Please follow the directions to create your username and password.     Your access code is: [email or web address]  Expires: 10/23/2018  3:06 PM     Your access code will  in 90 days. If you need help or a new code, please contact your [facility] or call [phone] for assistance.        Care EveryWhere ID     This is your Care EveryWhere ID. This could be used by other organizations to access your [facility] medical records  [id number]        Your Vitals Were     Pulse Temperature Height Pulse Oximetry BMI (Body Mass Index)       87 98.5  F (36.9  C) (Oral) 4' 11.06\" (150 cm) 94% 22.78 kg/m2        Blood Pressure from Last 3 Encounters:   18 129/70   17 136/79   17 122/78    Weight from Last 3 Encounters:   18 113 lb (51.3 kg)   17 123 lb 9.6 oz (56.1 kg) "   01/05/17 121 lb (54.9 kg)              Today, you had the following     No orders found for display         Today's Medication Changes          These changes are accurate as of 7/25/18  4:29 PM.  If you have any questions, ask your nurse or doctor.               These medicines have changed or have updated prescriptions.        Dose/Directions    lidocaine 5 % Patch   Commonly known as:  LIDODERM   This may have changed:    - how much to take  - when to take this  - reasons to take this  - additional instructions   Used for:  Postherpetic neuralgia        Apply up to 2 patches to painful area at once for up to 12 h within a 24 h period.  Remove after 12 hours.   Quantity:  60 patch   Refills:  11                Primary Care Provider Office Phone # Fax #    Ursula Quintanilla [phone]       [address] [place] [address]        Equal Access to Services     YANET WEBB : Helene barksdale Soaleksander, waaxkianna luqmarisol, qaybpalak kaalaubrey coleman, eve cook . So [facility] [phone].    ATENCIÓN: Si habla español, tiene a le disposición servicios gratuitos de asistencia lingüística. [facility] [phone].    We comply with applicable federal civil rights laws and [place] laws. We do not discriminate on the basis of race, color, national origin, age, disability, sex, sexual orientation, or gender identity.            Thank you!     Thank you for choosing [facility]  for your care. Our goal is always to provide you with excellent care. Hearing back from our patients is one way we can continue to improve our services. Please take a few minutes to complete the written survey that you may receive in the mail after your visit with us. Thank you!             Your Updated Medication List - Protect others around you: Learn how to safely use, store and throw away your medicines at www.disposemymeds.org.          This list is accurate as of 7/25/18  4:29 PM.   Always use your most recent med list.                   Brand Name Dispense Instructions for use Diagnosis    ACETAMINOPHEN      2 tablets. In the morning and before bed as needed.        aspirin 81 MG tablet      Take  by mouth daily.    Arthritis       CALTRATE 600 PO           COSOPT PF 22.3-6.8 MG/ML opthalmic solution   Generic drug:  dorzolamide-timolol PF      Apply  to eye.    Arthritis       gabapentin 400 MG capsule    NEURONTIN    180 capsule    Take 1-2 capsules (400-800 mg) by mouth 2 times daily    Neuralgia, postherpetic       lidocaine 5 % Patch    LIDODERM    60 patch    Apply up to 2 patches to painful area at once for up to 12 h within a 24 h period.  Remove after 12 hours.    Postherpetic neuralgia       lisinopril 20 MG tablet    PRINIVIL/ZESTRIL    90 tablet    Take 1 tablet (20 mg) by mouth daily    Essential hypertension, benign       loperamide 2 MG tablet    IMODIUM A-D     Take 2 mg by mouth daily as needed    Arthritis       XALATAN 0.005 % ophthalmic solution   Generic drug:  latanoprost      1 drop At Bedtime.    Arthritis

## 2018-07-25 NOTE — NURSING NOTE
"  Chief Complaint   Patient presents with     Wellness Visit     Medication Reconciliation     completed.        /70  Pulse 87  Temp 98.5  F (36.9  C) (Oral)  Ht 4' 11.06\" (150 cm)  Wt 113 lb (51.3 kg)  SpO2 94%  BMI 22.78 kg/m2    "

## 2018-08-23 ENCOUNTER — TELEPHONE (OUTPATIENT)
Dept: FAMILY MEDICINE | Facility: CLINIC | Age: 83
End: 2018-08-23

## 2018-08-23 NOTE — TELEPHONE ENCOUNTER
Both daughter Margaux and Marge have been informed, no need for antibiotics prior to tomorrow's appointment. Chandan DODD

## 2018-08-23 NOTE — TELEPHONE ENCOUNTER
Was advised by  to have patient take antibiotic today prior to her clinic appointment. Called patient to update, she stated she will need a prescription sent to [facility] on [address] due to no refills left of prior prescription. Will route to  who is in clinic this afternoon, will update covering RN for follow-up with providerCarmela Heck RN

## 2018-08-23 NOTE — TELEPHONE ENCOUNTER
"[facility] phone call message- general phone call:    Reason for call: Daughter calling stating that they want to know if mom should take her penicillin about an hour before her appointment. Cause her mother's toe nail area is \"infected\". 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 Notified her that removing it would be the doctors decision and her mom is scheduled with a resident tomorrow. Notified her that I wouldn't be able to schedule it as a procedure for tomorrow. The doctor might want to take a look at it to determine the next steps. Please call and advise if they need to take the penicillin before hand or not. Daughter will be at the work number till 12:30    Return call needed: Yes    OK to leave a message on voice mail? Yes    Primary language: English      needed? No    Call taken on August 23, 2018 at 9:49 AM by Milagros Hopkins    "

## 2018-08-23 NOTE — TELEPHONE ENCOUNTER
Daughter wondering if patient should take prophylactic antibiotics before appointment tomorrow. Daughter reports the toenail is almost all the way off, and it is intermittently producing drainage. The daughter is wondering if antibiotics should be given today, similar to when patient has a dental procedure, or if it can wait until after the appointment. Routing to house officer since PCP is not in clinic and appointment is tomorrow. Maria R DODD

## 2018-08-24 ENCOUNTER — OFFICE VISIT (OUTPATIENT)
Dept: FAMILY MEDICINE | Facility: CLINIC | Age: 83
End: 2018-08-24
Payer: COMMERCIAL

## 2018-08-24 VITALS
HEIGHT: 60 IN | OXYGEN SATURATION: 99 % | WEIGHT: 112.4 LBS | BODY MASS INDEX: 22.07 KG/M2 | DIASTOLIC BLOOD PRESSURE: 78 MMHG | SYSTOLIC BLOOD PRESSURE: 142 MMHG | TEMPERATURE: 97.9 F | RESPIRATION RATE: 16 BRPM | HEART RATE: 78 BPM

## 2018-08-24 DIAGNOSIS — B35.1 ONYCHOMYCOSIS: Primary | ICD-10-CM

## 2018-08-24 NOTE — PROGRESS NOTES
SUBJECTIVE:  Marge presents with pain and swelling of both sides of great toenail for 3 weeks. There has been erythema and purulent drainage. Treatment thus far has been: Warm soaks.   Discussion held including risks of anesthesia reaction, infection or bleeding complications, and pain. Small chance of possible recurrence of ingrown toe nail advised. Patient states understanding and wishes to continue with procedure.    OBJECTIVE  On physical exam, the toe as described above is reddened, edematous, tender to touch with purulent drainage.  Toe is cleansed with betadyne and prepped in usual manner.  Patient was then placed in a supine position.  A digital block is achieved in sterile manner using 3cc of bupivicaine.  A tourniquet was applied to the base of the toe for approximately 10 minutes. After anesthesia obtained entire nail elevated and removed. Hemostasis is achieved by pressure and with silver nitrate.  Toe was then dressed in a sterile tube gauze after bacitracin was applied.  The patient tolerated the procedure well.      ASSESSMENT:  Onychomycosis and infected nail bed    PLAN:  Total excision of ingrown toenail    Instructions were given to the patient.

## 2018-08-24 NOTE — MR AVS SNAPSHOT
"              After Visit Summary   8/24/2018    Marge Henry    MRN: [id number]           Patient Information     Date Of Birth          12/10/1934        Visit Information        Provider Department      8/24/2018 2:00 PM Franck Sneed MD [facility]        Today's Diagnoses     Onychomycosis    -  1       Follow-ups after your visit        Who to contact     Please call your clinic at [phone] to:    Ask questions about your health    Make or cancel appointments    Discuss your medicines    Learn about your test results    Speak to your doctor            Additional Information About Your Visit        Care EveryWhere ID     This is your Care EveryWhere ID. This could be used by other organizations to access your [facility] medical records  [id number]        Your Vitals Were     Pulse Temperature Respirations Height Pulse Oximetry BMI (Body Mass Index)    78 97.9  F (36.6  C) (Oral) 16 5' 0.25\" (153 cm) 99% 21.77 kg/m2       Blood Pressure from Last 3 Encounters:   08/24/18 142/78   07/25/18 129/70   07/19/17 136/79    Weight from Last 3 Encounters:   08/24/18 112 lb 6.4 oz (51 kg)   07/25/18 113 lb (51.3 kg)   07/19/17 123 lb 9.6 oz (56.1 kg)              We Performed the Following     REMOVAL OF NAIL PLATE SIMPLE SINGLE          Today's Medication Changes          These changes are accurate as of 8/24/18 11:59 PM.  If you have any questions, ask your nurse or doctor.               These medicines have changed or have updated prescriptions.        Dose/Directions    lidocaine 5 % Patch   Commonly known as:  LIDODERM   This may have changed:    - how much to take  - when to take this  - reasons to take this  - additional instructions   Used for:  Postherpetic neuralgia        Apply up to 2 patches to painful area at once for up to 12 h within a 24 h period.  Remove after 12 hours.   Quantity:  60 patch   Refills:  11                Primary Care Provider Office Phone # Fax #    Ursula Martienz " MD [place] [phone]       [address] [place] [address]        Equal Access to Services     YANET WEBB : Hadii aad ku hadneema Baer, nasim tanikatieshaha, dagoberto ashantimitzyda jared, eve palenciaricky rola. So [facility] [phone].    ATENCIÓN: Si habla español, tiene a le disposición servicios gratuitos de asistencia lingüística. Llame al [phone].    We comply with applicable federal civil rights laws and [place] laws. We do not discriminate on the basis of race, color, national origin, age, disability, sex, sexual orientation, or gender identity.            Thank you!     Thank you for choosing [facility]  for your care. Our goal is always to provide you with excellent care. Hearing back from our patients is one way we can continue to improve our services. Please take a few minutes to complete the written survey that you may receive in the mail after your visit with us. Thank you!             Your Updated Medication List - Protect others around you: Learn how to safely use, store and throw away your medicines at www.disposemymeds.org.          This list is accurate as of 8/24/18 11:59 PM.  Always use your most recent med list.                   Brand Name Dispense Instructions for use Diagnosis    ACETAMINOPHEN      2 tablets. In the morning and before bed as needed.        aspirin 81 MG tablet      Take  by mouth daily.    Arthritis       CALTRATE 600 PO           COSOPT PF 22.3-6.8 MG/ML opthalmic solution   Generic drug:  dorzolamide-timolol PF      Apply  to eye.    Arthritis       gabapentin 400 MG capsule    NEURONTIN    180 capsule    Take 1-2 capsules (400-800 mg) by mouth 2 times daily    Neuralgia, postherpetic       lidocaine 5 % Patch    LIDODERM    60 patch    Apply up to 2 patches to painful area at once for up to 12 h within a 24 h period.  Remove after 12 hours.    Postherpetic neuralgia       lisinopril 20 MG tablet    PRINIVIL/ZESTRIL     90 tablet    Take 1 tablet (20 mg) by mouth daily    Essential hypertension, benign       loperamide 2 MG tablet    IMODIUM A-D     Take 2 mg by mouth daily as needed    Arthritis       XALATAN 0.005 % ophthalmic solution   Generic drug:  latanoprost      1 drop At Bedtime.    Arthritis

## 2018-08-29 ENCOUNTER — TELEPHONE (OUTPATIENT)
Dept: FAMILY MEDICINE | Facility: CLINIC | Age: 83
End: 2018-08-29

## 2018-08-29 DIAGNOSIS — T14.8XXA LOCAL INFECTION OF WOUND: Primary | ICD-10-CM

## 2018-08-29 DIAGNOSIS — L08.9 LOCAL INFECTION OF WOUND: Primary | ICD-10-CM

## 2018-08-29 RX ORDER — BACITRACIN ZINC AND POLYMYXIN B SULFATE 500; 1000 [USP'U]/G; [USP'U]/G
OINTMENT TOPICAL 2 TIMES DAILY
Qty: 15 G | Refills: 0 | Status: SHIPPED | OUTPATIENT
Start: 2018-08-29 | End: 2018-09-12

## 2018-08-29 NOTE — TELEPHONE ENCOUNTER
Sent abx ointment script to pharmacy.  Keep wound dry, change bandaid once or twice daily with new ointment.

## 2018-08-29 NOTE — TELEPHONE ENCOUNTER
Patient had toenail removed last week and is having drainage and redness of area. She stated swelling is decreasing, but still experiencing pain and swelling. Patient denies chills, fever, SOB, nausea, vomiting. Patient requesting antibiotic cream, she is unable to be seen in clinic or urgent care r/t transportation issues for the rest of the week. Patient relies on daughter for transportation and daughter is unavailable. Will route to PCP for review. Removal of toenail was done on 08/24 in clinic. Maria R DODD

## 2018-09-01 NOTE — PROGRESS NOTES
Preceptor Attestation:  I was present for and supervised the entire procedure. I have verified the content of the note, which accurately reflects my assessment of the patient and the plan of care.  Supervising Physician:Ursula Quintanilla MD  [facility]

## 2018-09-21 DIAGNOSIS — I10 ESSENTIAL HYPERTENSION, BENIGN: ICD-10-CM

## 2018-09-21 RX ORDER — LISINOPRIL 20 MG/1
20 TABLET ORAL DAILY
Qty: 90 TABLET | Refills: 3 | Status: SHIPPED | OUTPATIENT
Start: 2018-09-21 | End: 2019-09-09

## 2018-09-21 NOTE — TELEPHONE ENCOUNTER
Message to physician:     Date of last visit: 8/24/2018     Date of next visit if scheduled: Visit date not found       Last Comprehensive Metabolic Panel:  Sodium   Date Value Ref Range Status   03/02/2016 136.0 133.0 - 144.0 mmol/L Final     Potassium   Date Value Ref Range Status   03/02/2016 4.6 3.4 - 5.3 mmol/L Final     Chloride   Date Value Ref Range Status   03/02/2016 97.0 94.0 - 109.0 mmol/L Final     Carbon Dioxide   Date Value Ref Range Status   03/02/2016 28.0 20.0 - 32.0 mmol/L Final     Glucose   Date Value Ref Range Status   03/02/2016 101.0 60.0 - 109.0 mg/dL Final     Urea Nitrogen   Date Value Ref Range Status   03/02/2016 13.0 7.0 - 30.0 mg/dL Final     Creatinine   Date Value Ref Range Status   03/02/2016 0.8 0.6 - 1.3 mg/dL Final     GFR Estimate   Date Value Ref Range Status   04/03/2012 > 60 >60 mL/min/1.7 Final     Calcium   Date Value Ref Range Status   03/02/2016 10.2 8.5 - 10.4 mg/dL Final       BP Readings from Last 3 Encounters:   08/24/18 142/78   07/25/18 129/70   07/19/17 136/79       No results found for: A1C             Please complete refill and CLOSE ENCOUNTER.  Closing the encounter signifies the refill is complete.

## 2019-03-29 DIAGNOSIS — B02.29 NEURALGIA, POSTHERPETIC: ICD-10-CM

## 2019-03-29 RX ORDER — GABAPENTIN 400 MG/1
400-800 CAPSULE ORAL 2 TIMES DAILY
Qty: 180 CAPSULE | Refills: 3 | Status: SHIPPED | OUTPATIENT
Start: 2019-03-29 | End: 2019-08-12

## 2019-08-12 DIAGNOSIS — B02.29 NEURALGIA, POSTHERPETIC: ICD-10-CM

## 2019-08-16 NOTE — TELEPHONE ENCOUNTER
Called and spoke with patient that patient is due for visit and patient stated that she will ask her daughter to call back and schedule appointment due to her daughter the one going to bring to her appointment.

## 2019-08-19 RX ORDER — GABAPENTIN 400 MG/1
400-800 CAPSULE ORAL 2 TIMES DAILY
Qty: 180 CAPSULE | Refills: 3 | Status: SHIPPED | OUTPATIENT
Start: 2019-08-19 | End: 2020-03-30

## 2019-09-09 DIAGNOSIS — I10 ESSENTIAL HYPERTENSION, BENIGN: ICD-10-CM

## 2019-09-09 RX ORDER — LISINOPRIL 20 MG/1
20 TABLET ORAL DAILY
Qty: 90 TABLET | Refills: 3 | Status: SHIPPED | OUTPATIENT
Start: 2019-09-09 | End: 2020-09-01

## 2019-09-11 ENCOUNTER — TELEPHONE (OUTPATIENT)
Dept: FAMILY MEDICINE | Facility: CLINIC | Age: 84
End: 2019-09-11

## 2019-09-11 NOTE — TELEPHONE ENCOUNTER
"Called patient for wellness intake, patient declined wellness visit. Patient stated \"I don't want all those people coming into the room and talking to me, I only want a physical\". Advised patient of wellness visits are physicals for those 65 and older at our clinic. Patient became upset and yelled \"I don't want a damn wellness visit, I want to lay on the table and be hooked up to those cords to check my heart\". I asked patient if she had heart concerns, palpitations or chest pain, patient stated no. Advised patient a wellness visit is the type of visit it would be with our clinic. Patient then requested appointment be changed to medication refills and lab work. Changed appointment for patient, patient verbalized understanding. Maria R DODD  "

## 2019-09-12 ENCOUNTER — OFFICE VISIT (OUTPATIENT)
Dept: FAMILY MEDICINE | Facility: CLINIC | Age: 84
End: 2019-09-12
Payer: COMMERCIAL

## 2019-09-12 VITALS
SYSTOLIC BLOOD PRESSURE: 167 MMHG | HEART RATE: 75 BPM | OXYGEN SATURATION: 96 % | TEMPERATURE: 97.6 F | DIASTOLIC BLOOD PRESSURE: 88 MMHG | HEIGHT: 60 IN | RESPIRATION RATE: 16 BRPM | BODY MASS INDEX: 21.6 KG/M2 | WEIGHT: 110 LBS

## 2019-09-12 DIAGNOSIS — B02.29 POSTHERPETIC NEURALGIA: ICD-10-CM

## 2019-09-12 DIAGNOSIS — R29.890 LOSS OF HEIGHT: ICD-10-CM

## 2019-09-12 DIAGNOSIS — K58.0 IRRITABLE BOWEL SYNDROME WITH DIARRHEA: ICD-10-CM

## 2019-09-12 DIAGNOSIS — I10 ESSENTIAL HYPERTENSION, BENIGN: Primary | ICD-10-CM

## 2019-09-12 LAB
BUN SERPL-MCNC: 16 MG/DL (ref 7–30)
CALCIUM SERPL-MCNC: 9.7 MG/DL (ref 8.5–10.4)
CHLORIDE SERPLBLD-SCNC: 98 MMOL/L (ref 94–109)
CO2 SERPL-SCNC: 26 MMOL/L (ref 20–32)
CREAT SERPL-MCNC: 0.8 MG/DL (ref 0.6–1.3)
EGFR CALCULATED (BLACK REFERENCE): 87.9 ML/MIN
EGFR CALCULATED (NON BLACK REFERENCE): 72.6 ML/MIN
GLUCOSE SERPL-MCNC: 93 MG/DL (ref 60–109)
POTASSIUM SERPL-SCNC: 4.2 MMOL/L (ref 3.4–5.3)
SODIUM SERPL-SCNC: 135 MMOL/L (ref 133–144)

## 2019-09-12 RX ORDER — ALCOHOL 70.47 ML/100ML
1 GEL TOPICAL
COMMUNITY

## 2019-09-12 RX ORDER — BRIMONIDINE TARTRATE 2 MG/ML
SOLUTION/ DROPS OPHTHALMIC
Refills: 11 | COMMUNITY
Start: 2019-01-28 | End: 2021-09-14

## 2019-09-12 ASSESSMENT — MIFFLIN-ST. JEOR: SCORE: 874.21

## 2019-09-12 NOTE — RESULT ENCOUNTER NOTE
Call patient:    You have very healthy kidneys.  Your blood pressure was elevated.  We'll recheck at your next visit in November.

## 2019-09-12 NOTE — PROGRESS NOTES
"SUBJECTIVE:            Marge Henry is a 84 year old female, here with daughter, in today for:    Chief Complaint   Patient presents with     Follow Up     blood pressure, HTN, and needs refills.      Medication Reconciliation     completed. Patient also taking Vitamin C, Osteo vitamins and Centrum Ascencion     1. HTN  -taking medication, here for labs, in general feels great    2.  Intermittent diarrhea  -possibly food or spice sensitivity, one episode of urgent diarrhea if eats possibly greasy or spicy food.  Not predictable, but resolved after taking just one immodium daily, can this be continued?  -never blood, never pain, sometimes normal BM    3. Postherpetic neuropathy  -gabapentin works, sometimes flares when she's anxious, never a rash, but wonders what else to try    Wt Readings from Last 5 Encounters:   09/12/19 49.9 kg (110 lb)   08/24/18 51 kg (112 lb 6.4 oz)   07/25/18 51.3 kg (113 lb)   07/19/17 56.1 kg (123 lb 9.6 oz)   01/05/17 54.9 kg (121 lb)         ROS:  CONSTITUTIONAL: NEGATIVE for fever, chills, change in weight  ENT/MOUTH: NEGATIVE for ear, mouth and throat problems  RESP: NEGATIVE for significant cough or SOB  CV: NEGATIVE for chest pain, palpitations or peripheral edema  GI: see HPI    Problem list, Medication list, Allergies, and Medical/Social/Surgical histories reviewed in [facility] and updated as appropriate.    OBJECTIVE:                          BP (!) 167/88   Pulse 75   Temp 97.6  F (36.4  C) (Oral)   Resp 16   Ht 1.53 m (5' 0.24\")   Wt 49.9 kg (110 lb)   SpO2 96%   BMI 21.31 kg/m     GENERAL: healthy, alert, well nourished, well hydrated, no distress  NECK: no tenderness, no adenopathy, no asymmetry, no masses, no stiffness; thyroid- normal to palpation  RESP: lungs clear to auscultation - no rales, no rhonchi, no wheezes  CV: regular rates and rhythm, normal S1 S2, no S3 or S4 and no murmur, no click or rub -  ABDOMEN: soft, no tenderness, no  hepatosplenomegaly, no masses, " normal bowel sounds  MS: extremities- no gross deformities noted, no edema     Diagnostic testing:(labs, x-rays, EKG) -   Results from last visit   Results for orders placed or performed in visit on 09/12/19   Basic Metabolic Panel ([facility])  - Results < 1 hr   Result Value Ref Range    Glucose 93.0 60.0 - 109.0 mg/dL    Urea Nitrogen 16.0 7.0 - 30.0 mg/dL    Creatinine 0.8 0.6 - 1.3 mg/dL    Sodium 135.0 133.0 - 144.0 mmol/L    Potassium 4.2 3.4 - 5.3 mmol/L    Chloride 98.0 94.0 - 109.0 mmol/L    Carbon Dioxide 26.0 20.0 - 32.0 mmol/L    Calcium 9.7 8.5 - 10.4 mg/dL    eGFR Calculated (Non Black Reference) 72.6 >60.0 mL/min    eGFR Calculated (Black Reference) 87.9 >60.0 mL/min         ASSESSMENT/PLAN:            (I10) Essential hypertension, benign  (primary encounter diagnosis)  Comment: not at goal today, pt hesitant to med change  Plan: Basic Metabolic Panel ([facility])  - Results < 1         hr        Recheck at next visit    (R29.890) Loss of height  Comment: agrees to screening dexa, on vitamin and calcium and reviewed appropriate dosing  Plan: Dexa hip/pelvis/spine*            (B02.29) Postherpetic neuralgia  Comment: continue  Plan:  No changes discussed use of topical capsaicin Zostrix roll on.    (K58.0) Irritable bowel syndrome with diarrhea  Comment: okay to continue with prn or qday immodium  Plan: reviewed red flags    Risks, benefits and alternatives of treatments discussed. Plan agreed on.      Followup:November for wellness, review of dexa and recheck BP    Will call, return to clinic, or go to ED if worsening or symptoms not improving as discussed.    See patient instructions.     Health Maintenance Due   Topic Date Due     DEXA  12/10/1934     ADVANCE CARE PLANNING  12/10/1934     ZOSTER IMMUNIZATION (1 of 2) 12/10/1984     MEDICARE ANNUAL WELLNESS VISIT  07/25/2019     FALL RISK ASSESSMENT  07/25/2019     INFLUENZA VACCINE (1) 09/01/2019     Health maintenance reviewed/updated? Yes    Ursula Martinez  MD [place]  [facility]

## 2019-09-12 NOTE — NURSING NOTE
"Chief Complaint   Patient presents with     Follow Up     blood pressure, HTN, and needs refills.      Medication Reconciliation     completed. Patient also taking Vitamin C, Osteo vitamins and Centrum Ascencion       BP (!) 167/88   Pulse 75   Temp 97.6  F (36.4  C) (Oral)   Resp 16   Ht 1.53 m (5' 0.24\")   Wt 49.9 kg (110 lb)   SpO2 96%   BMI 21.31 kg/m      "

## 2019-09-19 ENCOUNTER — TELEPHONE (OUTPATIENT)
Dept: FAMILY MEDICINE | Facility: CLINIC | Age: 84
End: 2019-09-19

## 2019-09-19 NOTE — TELEPHONE ENCOUNTER
[facility] phone call message - order or referral request for patient:     Order or referral being requested: Bone Density       Additional Comments: Caller stated that [facility] has not received order for the bone density test and they have tried calling a couple of times to schedule only to be told an order is needed.    OK to leave a message on voice mail? Yes    Primary language: English      needed? No    Call taken on September 19, 2019 at 12:03 PM by Rainer Infante

## 2019-09-23 NOTE — TELEPHONE ENCOUNTER
Spoke to the daughter to verify where she wanted her mom to go. Faxed it to [facility] in [place], daughter will call to schedule it.

## 2019-10-04 NOTE — RESULT ENCOUNTER NOTE
Call patient:    Kristan -5.4  Osteoporosis    Come in to discuss your bone density.  I want to review medications to help your bones.

## 2019-10-07 NOTE — RESULT ENCOUNTER NOTE
Called pt with results. Patient will have her daughter call to schedule appt to discuss due to her working schedule.   ---RANDAL Young

## 2019-10-08 ENCOUNTER — TELEPHONE (OUTPATIENT)
Dept: FAMILY MEDICINE | Facility: CLINIC | Age: 84
End: 2019-10-08

## 2019-10-08 NOTE — TELEPHONE ENCOUNTER
Patient daughter Katrin called back in regards to previous call out to patient regarding bone density results. Per result notes, provider requested a visit to discuss but Katrin states patient does not want to come in for a visit due to having to pay another copay. Would like a call instead, please call Katrin back to discuss and she will talk with patient(Mother).  ~ Miguel SIMEON (Chrissi). CMA

## 2019-10-24 NOTE — TELEPHONE ENCOUNTER
Daughter called back to check status and that pt has had a Bone Density done a couple of weeks ago. Would like to know what types of supplements to take instead of scheduling an appointment if not necessary. Pls call and advise.

## 2019-10-24 NOTE — TELEPHONE ENCOUNTER
Called daughter    Recommend visit, calcium daily 1000 mg and vitamin D 600 daily.    Recommend reclast infusion as she'll decline all meds or injections.    Need appt with me to discuss and daughters will discuss and schedule if able.

## 2019-11-21 ENCOUNTER — OFFICE VISIT (OUTPATIENT)
Dept: FAMILY MEDICINE | Facility: CLINIC | Age: 84
End: 2019-11-21
Payer: COMMERCIAL

## 2019-11-21 VITALS
HEART RATE: 80 BPM | RESPIRATION RATE: 16 BRPM | OXYGEN SATURATION: 99 % | SYSTOLIC BLOOD PRESSURE: 171 MMHG | BODY MASS INDEX: 21.01 KG/M2 | DIASTOLIC BLOOD PRESSURE: 90 MMHG | TEMPERATURE: 97.6 F | HEIGHT: 60 IN | WEIGHT: 107 LBS

## 2019-11-21 DIAGNOSIS — M81.0 AGE-RELATED OSTEOPOROSIS WITHOUT CURRENT PATHOLOGICAL FRACTURE: Primary | ICD-10-CM

## 2019-11-21 DIAGNOSIS — I10 ESSENTIAL HYPERTENSION, BENIGN: ICD-10-CM

## 2019-11-21 DIAGNOSIS — Z23 NEED FOR PROPHYLACTIC VACCINATION AND INOCULATION AGAINST INFLUENZA: ICD-10-CM

## 2019-11-21 LAB
CALCIUM SERPL-MCNC: 9.8 MG/DL (ref 8.5–10.5)
MAGNESIUM SERPL-MCNC: 2 MG/DL (ref 1.8–2.6)

## 2019-11-21 RX ORDER — ALENDRONATE SODIUM 70 MG/1
70 TABLET ORAL
Qty: 12 TABLET | Refills: 3 | Status: SHIPPED | OUTPATIENT
Start: 2019-11-21 | End: 2020-10-29

## 2019-11-21 ASSESSMENT — MIFFLIN-ST. JEOR: SCORE: 851.23

## 2019-11-21 NOTE — NURSING NOTE
"Chief Complaint   Patient presents with     Results     bone density results.      Medication Reconciliation     completed.        BP (!) 171/90   Pulse 80   Temp 97.6  F (36.4  C) (Oral)   Resp 16   Ht 1.515 m (4' 11.65\")   Wt 48.5 kg (107 lb)   SpO2 99%   BMI 21.15 kg/m        Ok for flu shot today.   ~ Miguel CROUCH (Chrissi) CMA [facility]    "

## 2019-11-21 NOTE — LETTER
November 26, 2019      Marge Henry  [address]   [place] [address]        Dear Marge,    Please see below for your test results.    Calcium, vitamin D and magnesium all currently normal.  Vitamin D is a bit low so agree with a daily supplement (dose 600-1000/day) and calcium also 1610-7040 mg daily.       Resulted Orders   Vitamin D 25-Hydroxy ([facility])   Result Value Ref Range    Vitamin D,25-Hydroxy 36.8 30.0 - 80.0 ng/mL    Narrative    Test performed by:  [facility] [address] [place] [address]  Deficiency <10.0 ng/mL  Insufficiency 10.0-29.9 ng/mL  Sufficiency 30.0-80.0 ng/mL  Toxicity (possible) >100.0 ng/mL   Calcium ([facility])   Result Value Ref Range    Calcium 9.8 8.5 - 10.5 mg/dL    Narrative    Test performed by:  [facility] [address] [place] [address]   Magnesium ([facility])   Result Value Ref Range    Magnesium 2.0 1.8 - 2.6 mg/dL    Narrative    Test performed by:  [facility] [address] [place] [address]       If you have any questions, please call the clinic to make an appointment.    Sincerely,    Urusla Quintanilla MD

## 2019-11-22 LAB — 25(OH)D3 SERPL-MCNC: 36.8 NG/ML (ref 30–80)

## 2019-11-23 NOTE — RESULT ENCOUNTER NOTE
Call patient:    Calcium, vitamin D and magnesium all currently normal.  Vitamin D is a bit low so agree with a daily supplement (dose 600-1000/day) and calcium also 2219-2697 mg daily.

## 2019-11-23 NOTE — PROGRESS NOTES
"SUBJECTIVE:  Chief Complaint   Patient presents with     Results     bone density results.      Medication Reconciliation     completed.      Imm/Inj     Flu Shot     1. Osteoporosis:  -patient surprised by Tscore -5 and unaware of any fractures  -here today with daughter and agrees to consider medication to help  -in general doesn't like pills so would prefer the simplest and would consider injections  -does take otc calcium and multivitamin    Will be getting teeth pulled and possibly lower dentures.    OBJECTIVE:  BP (!) 171/90   Pulse 80   Temp 97.6  F (36.4  C) (Oral)   Resp 16   Ht 1.515 m (4' 11.65\")   Wt 48.5 kg (107 lb)   SpO2 99%   BMI 21.15 kg/m    Gen: alert, oriented X 3, no acute distress, no sign of discomfort      ASSESSMENT/PLAN:  (M81.0) Age-related osteoporosis without current pathological fracture  (primary encounter diagnosis)  Comment: severe, patient and daughter interested in prolia option  Plan: alendronate (FOSAMAX) 70 MG tablet, Vitamin D         25-Hydroxy ([facility]), Calcium ([facility]),         Magnesium ([facility])          Lengthy discussion about med options: discussed side effects: esophageal issues, osteonecrosis jaw, cost, injection vs. IV.    Patient willing to try orals initially, check with dentist about teeth, and discussed calcium vitamin d supplementation.  If doesn't tolerate we could proceed to prolia.  May consider duration of therapy longer.  May reconsider iv reclast as well.  F/u in January.    -25 mins spent with pt >50% face to face counseling on above conditions.      (Z23) Need for prophylactic vaccination and inoculation against influenza  Comment: agrees  Plan: Fluzone high dose, 65+ years [72473]            (I10) Essential hypertension, benign  Comment: uncontrolled, patient adamant that has white coat and worries about her visit  Plan: reassess at next, continue same meds.        "

## 2019-11-26 DIAGNOSIS — B02.29 POSTHERPETIC NEURALGIA: ICD-10-CM

## 2019-11-26 NOTE — RESULT ENCOUNTER NOTE
Called and results mailed. ~ Miguel CROUCH (Chrissi) [facility]  [facility]

## 2019-11-26 NOTE — TELEPHONE ENCOUNTER
Message to physician: lidoderm patch 5%    Date of last visit: 11/21/2019     Date of next visit if scheduled: Visit date not found       Last Comprehensive Metabolic Panel:  Sodium   Date Value Ref Range Status   09/12/2019 135.0 133.0 - 144.0 mmol/L Final     Potassium   Date Value Ref Range Status   09/12/2019 4.2 3.4 - 5.3 mmol/L Final     Chloride   Date Value Ref Range Status   09/12/2019 98.0 94.0 - 109.0 mmol/L Final     Carbon Dioxide   Date Value Ref Range Status   09/12/2019 26.0 20.0 - 32.0 mmol/L Final     Glucose   Date Value Ref Range Status   09/12/2019 93.0 60.0 - 109.0 mg/dL Final     Urea Nitrogen   Date Value Ref Range Status   09/12/2019 16.0 7.0 - 30.0 mg/dL Final     Creatinine   Date Value Ref Range Status   09/12/2019 0.8 0.6 - 1.3 mg/dL Final     GFR Estimate   Date Value Ref Range Status   04/03/2012 > 60 >60 mL/min/1.7 Final     Calcium   Date Value Ref Range Status   11/21/2019 9.8 8.5 - 10.5 mg/dL Final       BP Readings from Last 3 Encounters:   11/21/19 (!) 171/90   09/12/19 (!) 167/88   08/24/18 142/78       No results found for: A1C             Please complete refill and CLOSE ENCOUNTER.  Closing the encounter signifies the refill is complete.

## 2019-11-27 RX ORDER — LIDOCAINE 50 MG/G
1 PATCH TOPICAL EVERY 24 HOURS
Qty: 30 PATCH | Refills: 11 | Status: SHIPPED | OUTPATIENT
Start: 2019-11-27

## 2019-12-20 ENCOUNTER — TELEPHONE (OUTPATIENT)
Dept: FAMILY MEDICINE | Facility: CLINIC | Age: 84
End: 2019-12-20

## 2019-12-20 DIAGNOSIS — B02.29 POSTHERPETIC NEURALGIA: ICD-10-CM

## 2019-12-20 NOTE — TELEPHONE ENCOUNTER
PA submitted and approved via [facility] 12/2/19.     Approval: [id number]  Approved through 12/2/2020.     Routed to team to call pharmacy to inform of approval.     Edith Alvarez, PharmD, CDE  [facility]  Phone: [phone]  December 20, 2019 at 9:35 AM

## 2019-12-20 NOTE — TELEPHONE ENCOUNTER
Called patient and she states she already picked the patches up.    ~ Miguel CROUCH (Chrissi) CMA [facility]  [phone]

## 2020-03-30 DIAGNOSIS — B02.29 NEURALGIA, POSTHERPETIC: ICD-10-CM

## 2020-03-30 RX ORDER — GABAPENTIN 400 MG/1
400-800 CAPSULE ORAL 2 TIMES DAILY
Qty: 180 CAPSULE | Refills: 3 | Status: SHIPPED | OUTPATIENT
Start: 2020-03-30 | End: 2020-09-22

## 2020-09-01 DIAGNOSIS — I10 ESSENTIAL HYPERTENSION, BENIGN: ICD-10-CM

## 2020-09-01 RX ORDER — LISINOPRIL 20 MG/1
20 TABLET ORAL DAILY
Qty: 90 TABLET | Refills: 3 | Status: SHIPPED | OUTPATIENT
Start: 2020-09-01 | End: 2021-09-14

## 2020-09-08 NOTE — TELEPHONE ENCOUNTER
Called and spoke with patient. Patient states she will have to have her daughter Katrin call to schedule.     RANDAL Nixon ([facility]) Arelis [facility] [address] [place] [id number] [phone]

## 2020-09-15 ENCOUNTER — OFFICE VISIT (OUTPATIENT)
Dept: FAMILY MEDICINE | Facility: CLINIC | Age: 85
End: 2020-09-15
Payer: COMMERCIAL

## 2020-09-15 VITALS
HEIGHT: 59 IN | SYSTOLIC BLOOD PRESSURE: 131 MMHG | OXYGEN SATURATION: 94 % | DIASTOLIC BLOOD PRESSURE: 79 MMHG | BODY MASS INDEX: 20.72 KG/M2 | RESPIRATION RATE: 18 BRPM | WEIGHT: 102.8 LBS | TEMPERATURE: 98.4 F | HEART RATE: 84 BPM

## 2020-09-15 DIAGNOSIS — I10 ESSENTIAL HYPERTENSION, BENIGN: Primary | ICD-10-CM

## 2020-09-15 DIAGNOSIS — B02.29 POSTHERPETIC NEURALGIA: ICD-10-CM

## 2020-09-15 DIAGNOSIS — R54 FRAIL ELDERLY: ICD-10-CM

## 2020-09-15 LAB
BUN SERPL-MCNC: 17 MG/DL (ref 7–30)
CALCIUM SERPL-MCNC: 9.9 MG/DL (ref 8.5–10.4)
CHLORIDE SERPLBLD-SCNC: 101 MMOL/L (ref 94–109)
CO2 SERPL-SCNC: 29 MMOL/L (ref 20–32)
CREAT SERPL-MCNC: 0.7 MG/DL (ref 0.6–1.3)
EGFR CALCULATED (BLACK REFERENCE): >90 ML/MIN
EGFR CALCULATED (NON BLACK REFERENCE): 84.5 ML/MIN
GLUCOSE SERPL-MCNC: 106 MG/DL (ref 60–109)
POTASSIUM SERPL-SCNC: 4.8 MMOL/L (ref 3.4–5.3)
SODIUM SERPL-SCNC: 141 MMOL/L (ref 133–144)

## 2020-09-15 RX ORDER — DORZOLAMIDE HYDROCHLORIDE AND TIMOLOL MALEATE 20; 5 MG/ML; MG/ML
SOLUTION/ DROPS OPHTHALMIC
COMMUNITY
Start: 2020-09-03 | End: 2021-09-14

## 2020-09-15 ASSESSMENT — MIFFLIN-ST. JEOR: SCORE: 817.8

## 2020-09-15 NOTE — NURSING NOTE
"Chief Complaint   Patient presents with     Follow Up     blood pressure and blood work     Medication Reconciliation     completed.        /79 (BP Location: Right arm, Patient Position: Sitting, Cuff Size: Adult Regular)   Pulse 84   Temp 98.4  F (36.9  C) (Oral)   Resp 18   Ht 1.5 m (4' 11.06\")   Wt 46.6 kg (102 lb 12.8 oz)   SpO2 94%   BMI 20.72 kg/m      BP Recheck if applicable: NA      ~ Miguel Infante CMA (Chrissi)  [facility]  Phone: [phone]    "

## 2020-09-15 NOTE — PROGRESS NOTES
"SUBJECTIVE:            Marge Henry is a 84 year old female, here with daughter, in today for:    Chief Complaint   Patient presents with     Follow Up     blood pressure and blood work     Medication Reconciliation     completed.      1. HTN  -taking medication, here for labs, in general feels good, has had weight loss due to teeth extractions, trying to stay strong    2. Glaucoma:  May need eye surgery, vision is reduced, affecting her balance    3. Postherpetic neuropathy  -gabapentin works, sometimes flares when she's anxious, never a rash, but wonders what else to try    Wt Readings from Last 5 Encounters:   09/15/20 46.6 kg (102 lb 12.8 oz)   11/21/19 48.5 kg (107 lb)   09/12/19 49.9 kg (110 lb)   08/24/18 51 kg (112 lb 6.4 oz)   07/25/18 51.3 kg (113 lb)         ROS:  CONSTITUTIONAL: NEGATIVE for fever, chills, change in weight  ENT/MOUTH: NEGATIVE for ear, mouth and throat problems  RESP: NEGATIVE for significant cough or SOB  CV: NEGATIVE for chest pain, palpitations or peripheral edema  GI: see HPI    Problem list, Medication list, Allergies, and Medical/Social/Surgical histories reviewed in [facility] and updated as appropriate.    OBJECTIVE:                          /79 (BP Location: Right arm, Patient Position: Sitting, Cuff Size: Adult Regular)   Pulse 84   Temp 98.4  F (36.9  C) (Oral)   Resp 18   Ht 1.5 m (4' 11.06\")   Wt 46.6 kg (102 lb 12.8 oz)   SpO2 94%   BMI 20.72 kg/m     GENERAL: healthy, alert, well nourished, well hydrated, no distress  NECK: no tenderness, no adenopathy, no asymmetry, no masses, no stiffness; thyroid- normal to palpation  HEENT: no teeth  RESP: lungs clear to auscultation - no rales, no rhonchi, no wheezes  CV: regular rates and rhythm, normal S1 S2, no S3 or S4 and no murmur, no click or rub -  ABDOMEN: soft, no tenderness, no  hepatosplenomegaly, no masses, normal bowel sounds  MS: extremities- no gross deformities noted, no edema     Diagnostic " testing:(labs, x-rays, EKG) -   Results from last visit   Results for orders placed or performed in visit on 09/15/20   Basic Metabolic Panel ([facility]) - Results < 1 hr     Status: None   Result Value Ref Range    Glucose 106.0 60.0 - 109.0 mg/dL    Urea Nitrogen 17.0 7.0 - 30.0 mg/dL    Creatinine 0.7 0.6 - 1.3 mg/dL    Sodium 141.0 133.0 - 144.0 mmol/L    Potassium 4.8 3.4 - 5.3 mmol/L    Chloride 101.0 94.0 - 109.0 mmol/L    Carbon Dioxide 29.0 20.0 - 32.0 mmol/L    Calcium 9.9 8.5 - 10.4 mg/dL    eGFR Calculated (Non Black Reference) 84.5 >60.0 mL/min    eGFR Calculated (Black Reference) >90 >60.0 mL/min         ASSESSMENT/PLAN:            (I10) Essential hypertension, benign  (primary encounter diagnosis)  Comment: at goal, no signs of hypotension and continue at same dose  Plan: Basic Metabolic Panel ([facility])  - Results < 1         hr        Recheck in 1 year or as needed with COVID    Postherpetic: discussed ok to reduce gabapentin if having less issues.    Frail elderly: discussed importance of protein intake daily and methods to improve with loss of teeth  Risks, benefits and alternatives of treatments discussed. Plan agreed on.      Followup:November for wellness, review of dexa and recheck BP    Will call, return to clinic, or go to ED if worsening or symptoms not improving as discussed.    See patient instructions.     Health Maintenance Due   Topic Date Due     ADVANCE CARE PLANNING  12/10/1934     ZOSTER IMMUNIZATION (1 of 2) 12/10/1984     MEDICARE ANNUAL WELLNESS VISIT  07/25/2019     FALL RISK ASSESSMENT  07/25/2019     PHQ-2  01/01/2020     MAMMO SCREENING  01/16/2020     INFLUENZA VACCINE (1) 09/01/2020     Health maintenance reviewed/updated? Yes    Ursula Quintanilla MD  [facility]

## 2020-09-22 DIAGNOSIS — B02.29 NEURALGIA, POSTHERPETIC: ICD-10-CM

## 2020-09-22 RX ORDER — GABAPENTIN 400 MG/1
400-800 CAPSULE ORAL 2 TIMES DAILY
Qty: 180 CAPSULE | Refills: 5 | Status: SHIPPED | OUTPATIENT
Start: 2020-09-22 | End: 2021-07-30

## 2020-10-16 ENCOUNTER — OFFICE VISIT (OUTPATIENT)
Dept: FAMILY MEDICINE | Facility: CLINIC | Age: 85
End: 2020-10-16
Payer: COMMERCIAL

## 2020-10-16 VITALS
OXYGEN SATURATION: 99 % | HEIGHT: 59 IN | TEMPERATURE: 98.1 F | RESPIRATION RATE: 12 BRPM | HEART RATE: 85 BPM | SYSTOLIC BLOOD PRESSURE: 152 MMHG | BODY MASS INDEX: 21.13 KG/M2 | WEIGHT: 104.8 LBS | DIASTOLIC BLOOD PRESSURE: 86 MMHG

## 2020-10-16 DIAGNOSIS — Z01.818 PRE-OPERATIVE EXAMINATION: Primary | ICD-10-CM

## 2020-10-16 DIAGNOSIS — Z23 NEED FOR PROPHYLACTIC VACCINATION AND INOCULATION AGAINST INFLUENZA: ICD-10-CM

## 2020-10-16 LAB
COVID-19 VIRUS PCR TO U OF MN - SOURCE: NORMAL
SARS-COV-2 RNA SPEC QL NAA+PROBE: NOT DETECTED

## 2020-10-16 PROCEDURE — 90471 IMMUNIZATION ADMIN: CPT | Performed by: STUDENT IN AN ORGANIZED HEALTH CARE EDUCATION/TRAINING PROGRAM

## 2020-10-16 PROCEDURE — 90662 IIV NO PRSV INCREASED AG IM: CPT | Performed by: STUDENT IN AN ORGANIZED HEALTH CARE EDUCATION/TRAINING PROGRAM

## 2020-10-16 PROCEDURE — 87635 SARS-COV-2 COVID-19 AMP PRB: CPT | Performed by: STUDENT IN AN ORGANIZED HEALTH CARE EDUCATION/TRAINING PROGRAM

## 2020-10-16 PROCEDURE — 99213 OFFICE O/P EST LOW 20 MIN: CPT | Mod: 25 | Performed by: STUDENT IN AN ORGANIZED HEALTH CARE EDUCATION/TRAINING PROGRAM

## 2020-10-16 ASSESSMENT — MIFFLIN-ST. JEOR: SCORE: 826.87

## 2020-10-16 NOTE — NURSING NOTE
"  Chief Complaint   Patient presents with     Pre-Op Exam     LEFT EYE GLAUCOMA SURGERY WITH: Dr. Joya at the [facility]. Phone [phone]  Fax: [phone]     Medication Reconciliation     completed.        BP (!) 159/76 (BP Location: Right arm, Patient Position: Sitting, Cuff Size: Adult Regular)   Pulse 85   Temp 98.1  F (36.7  C) (Oral)   Resp 12   Ht 1.5 m (4' 11.06\")   Wt 47.5 kg (104 lb 12.8 oz)   SpO2 99%   BMI 21.13 kg/m      BP Recheck if applicable: 152/86       ~ Miguel Infante CMA ([place])  [facility]  Phone: [phone]        "

## 2020-10-16 NOTE — PROGRESS NOTES
I have personally reviewed the history and examination as documented by Dr. Edwards.  I was present during key portions of the visit and agree with the assessment and plan as documented for 85 yr old female with HTN here for pre-op clearance for cataract surgery. Clearance provided. Precautions given. Anticipatory guidance given.     Manuel Oliva MD  October 16, 2020  4:23 PM

## 2020-10-16 NOTE — PROGRESS NOTES
[facility] [address] [place]  [place] [address]  Phone: [phone]  Fax: [phone]  Primary Provider: Ursula Quintanilla  Pre-op Performing Provider: OLAMIDE BELL    PREOPERATIVE EVALUATION:  Today's date: 10/16/2020    Marge Henry is a 85 year old female who presents for a preoperative evaluation for a stent placement for her glaucoma in left eye    Surgical Information:  Surgery/Procedure: Glaucoma stent left eye  Surgery Location: [facility]  Surgeon: Unknown  Surgery Date: October 22  Time of Surgery: Unknown  Where patient plans to recover: At home with family  Fax number for surgical facility: [facility] to fax note    Type of Anesthesia Anticipated: to be determined    Subjective     HPI related to upcoming procedure: History of glaucoma in left eye    Preop Questions 10/16/2020   1. Have you ever had a heart attack or stroke? No   2. Have you ever had surgery on your heart or blood vessels, such as a stent placement, a coronary artery bypass, or surgery on an artery in your head, neck, heart, or legs? No   3. Do you have chest pain with activity? No   4. Do you have a history of  heart failure? No   5. Do you currently have a cold, bronchitis or symptoms of other infection? No   6. Do you have a cough, shortness of breath, or wheezing? No   7. Do you or anyone in your family have previous history of blood clots? No   8. Do you or does anyone in your family have a serious bleeding problem such as prolonged bleeding following surgeries or cuts? No   9. Have you ever had problems with anemia or been told to take iron pills? No   10. Have you had any abnormal blood loss such as black, tarry or bloody stools, or abnormal vaginal bleeding? No   11. Have you ever had a blood transfusion? No   12. Are you willing to have a blood transfusion if it is medically needed before, during, or after your surgery? Yes   13. Have you or any of your relatives ever had  problems with anesthesia? No   14. Do you have sleep apnea, excessive snoring or daytime drowsiness? No   15. Do you have any artifical heart valves or other implanted medical devices like a pacemaker, defibrillator, or continuous glucose monitor? No   16. Do you have artificial joints? No   17. Are you allergic to latex? No       Health Care Directive:  Patient does not have a Health Care Directive or Living Will: Advance Directive received and scanned. Click on Code in the patient header to view.    Status of Chronic Conditions:  HYPERTENSION - Patient has longstanding history of HTN , currently denies any symptoms referable to elevated blood pressure. Specifically denies chest pain, palpitations, dyspnea, orthopnea, PND or peripheral edema. Blood pressure readings have not been in normal range. Current medication regimen is as listed below. Patient denies any side effects of medication.   Arthritis and IBS    Review of Systems  CONSTITUTIONAL: NEGATIVE for fever, chills, change in weight  INTEGUMENTARY/SKIN: NEGATIVE for worrisome rashes, moles or lesions  EYES: blurred vision left and tearing left  ENT/MOUTH: NEGATIVE for ear, mouth and throat problems  RESP: NEGATIVE for significant cough or SOB  CV: NEGATIVE for chest pain, palpitations or peripheral edema  GI: Hx of irritable bowel disease, patient endorses occasional abdominal pain and diarrhea  MUSCULOSKELETAL: NEGATIVE for significant arthralgias or myalgia  NEURO: NEGATIVE for weakness, dizziness or paresthesias  HEME: NEGATIVE for bleeding problems  PSYCHIATRIC: NEGATIVE for changes in mood or affect    Patient Active Problem List    Diagnosis Date Noted     Frail elderly 09/15/2020     Priority: Medium     Personal history of urinary tract infection 07/25/2018     Priority: Medium     Allow for phone script if calls in (without a visit)    Bactrim DS 1 po BID X 3 days       Postherpetic neuralgia 05/21/2014     Priority: Medium     Shingles 10/07/2013      Priority: Medium     Primary angle-closure glaucoma 01/02/2013     Priority: Medium     Problem list name updated by automated process. Provider to review       Essential hypertension, benign 01/02/2013     Priority: Medium     Irritable bowel syndrome 01/02/2013     Priority: Medium     Osteoarthrosis, unspecified whether generalized or localized, pelvic region and thigh 01/02/2013     Priority: Medium     Osteoarthrosis, unspecified whether generalized or localized, involving lower leg 01/02/2013     Priority: Medium     Problem list name updated by automated process. Provider to review       Health Care Home 01/02/2013     Priority: Medium     Tier 1  DX V65.8 REPLACED WITH 45938 HEALTH CARE HOME (04/08/2013)        Past Medical History:   Diagnosis Date     Essential hypertension, benign 1/2/2013     Irritable bowel syndrome 1/2/2013     Osteoarthrosis, unspecified whether generalized or localized, pelvic region and thigh 1/2/2013     Primary angle-closure glaucoma, unspecified(365.20) 1/2/2013     Past Surgical History:   Procedure Laterality Date     ARTHROPLASTY HIP BILATERAL  2007    2 months apart     BREAST BIOPSY, RT/LT  1990s    benign bilateral     Current Outpatient Medications   Medication Sig Dispense Refill     ACETAMINOPHEN 2 tablets. In the morning and before bed as needed.       alendronate (FOSAMAX) 70 MG tablet Take 1 tablet (70 mg) by mouth every 7 days Take with over 8 ounces water and stay upright for at least 30 minutes after dose. Empty stomach 12 tablet 3     aspirin 81 MG tablet Take  by mouth daily.       brimonidine (ALPHAGAN) 0.2 % ophthalmic solution Apply 1 drop(s) in left eye 2 times a day  11     Calcium Carbonate (CALTRATE 600 PO)        Dorzolamide HCl-Timolol Mal PF (COSOPT PF) 22.3-6.8 MG/ML SOLN Apply  to eye.       dorzolamide-timolol (COSOPT) 2-0.5 % ophthalmic solution instill 1 drop Left Eye twice a day       gabapentin (NEURONTIN) 400 MG capsule Take 1-2 capsules  (400-800 mg) by mouth 2 times daily 180 capsule 5     latanoprost (XALATAN) 0.005 % ophthalmic solution 1 drop At Bedtime.       lidocaine (LIDODERM) 5 % patch Place 1 patch onto the skin every 24 hours Apply to painful area at once for up to 12 h within a 24 h period. 30 patch 11     lisinopril (ZESTRIL) 20 MG tablet Take 1 tablet (20 mg) by mouth daily 90 tablet 3     loperamide (IMODIUM A-D) 2 MG tablet Take 2 mg by mouth daily as needed        multivitamin (THERMEMS) TABS Take 1 tablet by mouth         No Known Allergies     Social History     Tobacco Use     Smoking status: Former Smoker     Smokeless tobacco: Never Used   Substance Use Topics     Alcohol use: Yes     Alcohol/week: 0.0 standard drinks     Comment: 1-2 cans of beer/Wine Occasionally     Family History   Problem Relation Age of Onset     Cancer No family hx of      Diabetes No family hx of      Heart Disease No family hx of      Coronary Artery Disease No family hx of      History   Drug Use No         Objective     There were no vitals taken for this visit.    Physical Exam    GENERAL APPEARANCE: healthy, alert and no distress     NECK: no adenopathy, no asymmetry, masses, or scars and thyroid normal to palpation     RESP: lungs clear to auscultation - no rales, rhonchi or wheezes     CV: regular rates and rhythm, normal S1 S2, no S3 or S4 and no murmur, click or rub     ABDOMEN:  soft, nontender, no HSM or masses and bowel sounds normal     MS: extremities normal- no gross deformities noted, no evidence of inflammation in joints, FROM in all extremities.     SKIN: no suspicious lesions or rashes     NEURO: Normal strength and tone, sensory exam grossly normal, mentation intact and speech normal     PSYCH: mentation appears normal. and affect normal/bright     LYMPHATICS: No cervical adenopathy    Recent Labs   Lab Test 09/15/20  1502 09/12/19  1542   .0 135.0   POTASSIUM 4.8 4.2   CR 0.7 0.8        Diagnostics:  No labs were ordered  during this visit.   No EKG required for low risk surgery (cataract, skin procedure, breast biopsy, etc).    Revised Cardiac Risk Index (RCRI):  The patient has the following serious cardiovascular risks for perioperative complications:   - No serious cardiac risks = 0 points     RCRI Interpretation: 0 points: Class I (very low risk - 0.4% complication rate)       Assessment & Plan   The proposed surgical procedure is considered LOW risk.    1. Pre-operative examination  Patient seen for pre-op exam for a stent placement in her left eye for her glaucoma. Surgery is low risk, patient with RCRI class I, no need further lab work or EKG. Ok to proceed with surgery  - COVID swab today  - Hold aspirin for 1 week prior to surgery    Risks and Recommendations:  The patient has the following additional risks and recommendations for perioperative complications:   - No identified additional risk factors other than previously addressed    Medication Instructions:  Patient is to take all scheduled medications on the day of surgery EXCEPT for modifications listed below:   Hold Aspirin for 1 week prior to surgery, can resume day after surgery    RECOMMENDATION:  APPROVAL GIVEN to proceed with proposed procedure, without further diagnostic evaluation.    Signed Electronically by: Regina Edwards MD    Staffed with Dr. Pj MD

## 2020-10-20 NOTE — PATIENT INSTRUCTIONS
Pre-op faxed to fax number :  [phone]  Location :  [facility]  Date of Surgery :  10.22.2020  By/Date :    RANDAL Nixon ([facility]) Arelis [facility] [address] [place] [address]  [phone]  10.20.2020

## 2020-10-20 NOTE — RESULT ENCOUNTER NOTE
Called patient with results.     ~ Miguel CROUCH ([place]) CMA [facility]  [phone]

## 2020-10-29 DIAGNOSIS — M81.0 AGE-RELATED OSTEOPOROSIS WITHOUT CURRENT PATHOLOGICAL FRACTURE: ICD-10-CM

## 2020-10-29 RX ORDER — ALENDRONATE SODIUM 70 MG/1
70 TABLET ORAL
Qty: 12 TABLET | Refills: 3 | Status: SHIPPED | OUTPATIENT
Start: 2020-10-29 | End: 2021-10-04

## 2020-11-25 ENCOUNTER — TELEPHONE (OUTPATIENT)
Dept: FAMILY MEDICINE | Facility: CLINIC | Age: 85
End: 2020-11-25

## 2020-11-25 NOTE — TELEPHONE ENCOUNTER
[facility] not [facility] BUT is due for blood pressure recheck as last was high in October. Called and spoke to patient. Patient declined follow up and stated doesn't like to go out and feels healthy and will call if she has questions or begins to not feel right. informed patient we are always here for here as well.

## 2021-01-29 ENCOUNTER — TELEPHONE (OUTPATIENT)
Dept: FAMILY MEDICINE | Facility: CLINIC | Age: 86
End: 2021-01-29

## 2021-01-29 NOTE — TELEPHONE ENCOUNTER
[facility] phone call message- general phone call:    Reason for call: Patient is calling to see if we are doing the covid vaccine here yet because she does not want to get it anywhere else but here, told her that we are not. She is wondering if she can be put in a list to call when we have it avail, told her we don't have a list yet. Patient then states to leave a message for Dr. Quintanilla to call her regarding the covid vaccine. Told her it could take up to two business days for a response. Please call and advise.      Return call needed: Yes    OK to leave a message on voice mail?     Primary language: English      needed? No    Call taken on January 29, 2021 at 9:15 AM by Jorge Arias

## 2021-03-12 ENCOUNTER — AMBULATORY - HEALTHEAST (OUTPATIENT)
Dept: NURSING | Facility: CLINIC | Age: 86
End: 2021-03-12

## 2021-04-02 ENCOUNTER — AMBULATORY - HEALTHEAST (OUTPATIENT)
Dept: NURSING | Facility: CLINIC | Age: 86
End: 2021-04-02

## 2021-04-10 ENCOUNTER — HEALTH MAINTENANCE LETTER (OUTPATIENT)
Age: 86
End: 2021-04-10

## 2021-05-25 ENCOUNTER — RECORDS - HEALTHEAST (OUTPATIENT)
Dept: ADMINISTRATIVE | Facility: CLINIC | Age: 86
End: 2021-05-25

## 2021-05-27 ENCOUNTER — RECORDS - HEALTHEAST (OUTPATIENT)
Dept: ADMINISTRATIVE | Facility: CLINIC | Age: 86
End: 2021-05-27

## 2021-05-28 ENCOUNTER — RECORDS - HEALTHEAST (OUTPATIENT)
Dept: ADMINISTRATIVE | Facility: CLINIC | Age: 86
End: 2021-05-28

## 2021-05-30 ENCOUNTER — RECORDS - HEALTHEAST (OUTPATIENT)
Dept: ADMINISTRATIVE | Facility: CLINIC | Age: 86
End: 2021-05-30

## 2021-06-01 ENCOUNTER — RECORDS - HEALTHEAST (OUTPATIENT)
Dept: ADMINISTRATIVE | Facility: CLINIC | Age: 86
End: 2021-06-01

## 2021-07-13 ENCOUNTER — RECORDS - HEALTHEAST (OUTPATIENT)
Dept: ADMINISTRATIVE | Facility: CLINIC | Age: 86
End: 2021-07-13

## 2021-07-21 ENCOUNTER — RECORDS - HEALTHEAST (OUTPATIENT)
Dept: ADMINISTRATIVE | Facility: CLINIC | Age: 86
End: 2021-07-21

## 2021-07-30 DIAGNOSIS — B02.29 NEURALGIA, POSTHERPETIC: ICD-10-CM

## 2021-07-30 RX ORDER — GABAPENTIN 400 MG/1
400-800 CAPSULE ORAL 2 TIMES DAILY
Qty: 180 CAPSULE | Refills: 3 | Status: SHIPPED | OUTPATIENT
Start: 2021-07-30 | End: 2022-02-02

## 2021-07-30 NOTE — TELEPHONE ENCOUNTER
Message to physician:     Date of last visit: 10/16/2020     Date of next visit if scheduled: none    Last Comprehensive Metabolic Panel:  Sodium   Date Value Ref Range Status   09/15/2020 141.0 133.0 - 144.0 mmol/L Final     Potassium   Date Value Ref Range Status   09/15/2020 4.8 3.4 - 5.3 mmol/L Final     Chloride   Date Value Ref Range Status   09/15/2020 101.0 94.0 - 109.0 mmol/L Final     Carbon Dioxide   Date Value Ref Range Status   09/15/2020 29.0 20.0 - 32.0 mmol/L Final     Glucose   Date Value Ref Range Status   09/15/2020 106.0 60.0 - 109.0 mg/dL Final     Urea Nitrogen   Date Value Ref Range Status   09/15/2020 17.0 7.0 - 30.0 mg/dL Final     Creatinine   Date Value Ref Range Status   09/15/2020 0.7 0.6 - 1.3 mg/dL Final     GFR Estimate   Date Value Ref Range Status   04/03/2012 > 60 >60 mL/min/1.7 Final     Calcium   Date Value Ref Range Status   09/15/2020 9.9 8.5 - 10.4 mg/dL Final       BP Readings from Last 3 Encounters:   10/16/20 (!) 152/86   09/15/20 131/79   11/21/19 (!) 171/90       No results found for: A1C             Please complete refill and CLOSE ENCOUNTER.  Closing the encounter signifies the refill is complete.

## 2021-09-13 NOTE — PROGRESS NOTES
ASSESSMENT/PLAN:              Health Maintenance Due   Topic Date Due     ADVANCE CARE PLANNING  Never done     ZOSTER IMMUNIZATION (1 of 2) Never done     MAMMO SCREENING  01/16/2019     MEDICARE ANNUAL WELLNESS VISIT  07/25/2019     FALL RISK ASSESSMENT  07/25/2019     PHQ-2  01/01/2021     INFLUENZA VACCINE (1) 09/01/2021     Health maintenance reviewed/updated? Yes    Ursula Quintanilla MD  [facility]      SUBJECTIVE:            Marge Henry is a 84 year old female, here with daughter, in today for:    No chief complaint on file.    1. HTN    2. Postherpetic neuropathy  -gabapentin works, sometimes flares when she's anxious, never a rash, but wonders what else to try    Wt Readings from Last 5 Encounters:   10/16/20 47.5 kg (104 lb 12.8 oz)   09/15/20 46.6 kg (102 lb 12.8 oz)   11/21/19 48.5 kg (107 lb)   09/12/19 49.9 kg (110 lb)   08/24/18 51 kg (112 lb 6.4 oz)         ROS:  CONSTITUTIONAL: NEGATIVE for fever, chills, change in weight  ENT/MOUTH: NEGATIVE for ear, mouth and throat problems  RESP: NEGATIVE for significant cough or SOB  CV: NEGATIVE for chest pain, palpitations or peripheral edema  GI: see HPI    Problem list, Medication list, Allergies, and Medical/Social/Surgical histories reviewed in EP[facility] and updated as appropriate.    OBJECTIVE:                          There were no vitals taken for this visit.   GENERAL: healthy, alert, well nourished, well hydrated, no distress  NECK: no tenderness, no adenopathy, no asymmetry, no masses, no stiffness; thyroid- normal to palpation  HEENT: no teeth  RESP: lungs clear to auscultation - no rales, no rhonchi, no wheezes  CV: regular rates and rhythm, normal S1 S2, no S3 or S4 and no murmur, no click or rub -  ABDOMEN: soft, no tenderness, no  hepatosplenomegaly, no masses, normal bowel sounds  MS: extremities- no gross deformities noted, no edema     Diagnostic testing:(labs, x-rays, EKG) -   Results from last visit   No results found  for any visits on 09/14/21.

## 2021-09-14 ENCOUNTER — APPOINTMENT (OUTPATIENT)
Dept: FAMILY MEDICINE | Facility: CLINIC | Age: 86
End: 2021-09-14
Payer: COMMERCIAL

## 2021-09-14 ENCOUNTER — OFFICE VISIT (OUTPATIENT)
Dept: FAMILY MEDICINE | Facility: CLINIC | Age: 86
End: 2021-09-14
Payer: COMMERCIAL

## 2021-09-14 VITALS
WEIGHT: 112 LBS | OXYGEN SATURATION: 96 % | HEART RATE: 68 BPM | RESPIRATION RATE: 18 BRPM | BODY MASS INDEX: 22.58 KG/M2 | SYSTOLIC BLOOD PRESSURE: 124 MMHG | TEMPERATURE: 97.9 F | HEIGHT: 59 IN | DIASTOLIC BLOOD PRESSURE: 71 MMHG

## 2021-09-14 DIAGNOSIS — Z23 NEED FOR PROPHYLACTIC VACCINATION AND INOCULATION AGAINST INFLUENZA: ICD-10-CM

## 2021-09-14 DIAGNOSIS — Z00.00 ENCOUNTER FOR SUBSEQUENT ANNUAL WELLNESS VISIT IN MEDICARE PATIENT: Primary | ICD-10-CM

## 2021-09-14 DIAGNOSIS — I10 ESSENTIAL HYPERTENSION, BENIGN: ICD-10-CM

## 2021-09-14 DIAGNOSIS — M81.0 AGE-RELATED OSTEOPOROSIS WITHOUT CURRENT PATHOLOGICAL FRACTURE: ICD-10-CM

## 2021-09-14 LAB
CHOLEST SERPL-MCNC: 193 MG/DL
FASTING STATUS PATIENT QL REPORTED: NORMAL
HDLC SERPL-MCNC: 74 MG/DL
LDLC SERPL CALC-MCNC: 94 MG/DL
TRIGL SERPL-MCNC: 125 MG/DL

## 2021-09-14 PROCEDURE — 80061 LIPID PANEL: CPT | Performed by: FAMILY MEDICINE

## 2021-09-14 PROCEDURE — G0439 PPPS, SUBSEQ VISIT: HCPCS | Performed by: FAMILY MEDICINE

## 2021-09-14 PROCEDURE — G0008 ADMIN INFLUENZA VIRUS VAC: HCPCS | Performed by: FAMILY MEDICINE

## 2021-09-14 PROCEDURE — 36415 COLL VENOUS BLD VENIPUNCTURE: CPT | Performed by: FAMILY MEDICINE

## 2021-09-14 PROCEDURE — 90662 IIV NO PRSV INCREASED AG IM: CPT | Performed by: FAMILY MEDICINE

## 2021-09-14 RX ORDER — TIMOLOL MALEATE 5 MG/ML
SOLUTION/ DROPS OPHTHALMIC
COMMUNITY
Start: 2021-08-08

## 2021-09-14 RX ORDER — LISINOPRIL 20 MG/1
20 TABLET ORAL DAILY
Qty: 90 TABLET | Refills: 3 | Status: SHIPPED | OUTPATIENT
Start: 2021-09-14 | End: 2022-09-06

## 2021-09-14 ASSESSMENT — MIFFLIN-ST. JEOR: SCORE: 853.66

## 2021-09-14 NOTE — PROGRESS NOTES
Annual Wellness Visit for 65 years and older    HPI  This 86 year old female presents as an established patient  Ursula Quintanilla who presents for an annual wellness visit.    Other issues patient wants to be addressed today:  Chief Complaint   Patient presents with     Wellness Visit     annual wellness     Imm/Inj     Flu Shot       Patient Active Problem List   Diagnosis     Primary angle-closure glaucoma     Essential hypertension, benign     Irritable bowel syndrome     Osteoarthrosis, unspecified whether generalized or localized, pelvic region and thigh     Osteoarthrosis, unspecified whether generalized or localized, involving lower leg     Health Care Home     Shingles     Postherpetic neuralgia     Personal history of urinary tract infection     Frail elderly     Past Medical History:   Diagnosis Date     Essential hypertension, benign 1/2/2013     Irritable bowel syndrome 1/2/2013     Osteoarthrosis, unspecified whether generalized or localized, pelvic region and thigh 1/2/2013     Primary angle-closure glaucoma, unspecified(365.20) 1/2/2013       Family History   Problem Relation Age of Onset     Cancer No family hx of      Diabetes No family hx of      Heart Disease No family hx of      Coronary Artery Disease No family hx of      Problem List, Family History and past Medical History reviewed and  unchanged/updated.      FOR WOMEN  No vaginal bleeding      Frailty Assessment    1. Weight loss (>5% in year)  No  Wt Readings from Last 5 Encounters:   09/14/21 50.8 kg (112 lb)   10/16/20 47.5 kg (104 lb 12.8 oz)   09/15/20 46.6 kg (102 lb 12.8 oz)   11/21/19 48.5 kg (107 lb)   09/12/19 49.9 kg (110 lb)       2. Exhaustion (perceived effort for a given activity)   How difficult is walking from one room to the other on the same level?not   No     3. Weakness (handgrip strength)   How difficult is lifting or carrying something as heavy as 10 pounds? moderately   Yes    4. Decreased physical activity     Compared with most (men/women) your age, would you say  that you are more active, less active, or about the same? same   No    5. Slow gait speed (timed up and go > 12 sec.)  Yes  Time sit stand is normal  FALL RISK ASSESSMENT 9/14/2021 7/25/2018   Fallen 2 or more times in the past year? No No   Any fall with injury in the past year? No No   Timed Up and Go Test/Seconds (13.5 is a fall risk; contact physician) 24 -         Frailty screen score: 1    Frail Assessment:1-2 Prefrail     Home alone      EVALUATION OF COGNITIVE FUNCTION  Mood/affect:Normal  Appearance:Normal  Family member/caregiver input: Normal    PHQ-2 Score:   PHQ-2 ( 1999 Pfizer) 9/14/2021 10/16/2020   Q1: Little interest or pleasure in doing things 0 0   Q2: Feeling down, depressed or hopeless 0 0   PHQ-2 Score 0 0       PHQ-9 Score:   No flowsheet data found.    Mini Cog Test:      Recall result:  3 points   Clock Draw Test result: Normal    Cognitive screen is:Negative      Other Assessments:  CV Risk based on Pooled Cohort Risk   The ASCVD Risk score ([place] DEYANIRA Almanza., et al., 2013) failed to calculate for the following reasons:    The 2013 ASCVD risk score is only valid for ages 40 to 79          ECG (if done)not performed    Advance Directives: Discussed with patient and family as appropriate. Has patient  completed advance directives and/or a living will? yes      Immunization History   Administered Date(s) Administered     COVID-19,PF,Pfizer 03/12/2021, 04/02/2021     Influenza (High Dose) 3 valent vaccine 01/05/2017, 11/21/2019     Influenza (IIV3) PF 02/07/2011     Influenza, Quad, High Dose, Pf, 65yr+ (Fluzone HD) 10/16/2020     Pneumo Conj 13-V (2010&after) 07/19/2017     Pneumococcal 23 valent 04/18/2006     TDAP Vaccine (Boostrix) 07/19/2017     Tdap (Adacel,Boostrix) 04/18/2006     Reviewed Immunization Record Today  Physical Exam    Vitals: /71 (BP Location: Right arm)   Pulse 68   Temp 97.9  F (36.6  C) (Oral)   Resp 18   Ht  "1.499 m (4' 11\")   Wt 50.8 kg (112 lb)   SpO2 96%   BMI 22.62 kg/m    BMI= Body mass index is 22.62 kg/m .  EXAM:  Gen: alert, oriented X 3, no acute distress, no sign of discomfort  LUNGS: CTAB, no wheezing, no rales, no crackles, no accessory muscle use  COR: normal rate, regular rhythm and no murmurs, clicks, or gallops  -lower extremities : no edema no rash       Assessment and Plan:      Reviewed Preventive Services and Plan form with patient as specified in  Patient Instructions.  Positive findings on assessment: normal aging  Marge was seen today for wellness visit and imm/inj.    Diagnoses and all orders for this visit:    Encounter for subsequent annual wellness visit in Medicare patient    Need for prophylactic vaccination and inoculation against influenza  -     INFLUENZA, QUAD, HIGH DOSE, PF, 65YR + (FLUZONE HD)    Essential hypertension, benign  -     lisinopril (ZESTRIL) 20 MG tablet; Take 1 tablet (20 mg) by mouth daily  -     Lipid Profile (Chol, Trig, HDL, LDL calc); Future  -     Lipid Profile (Chol, Trig, HDL, LDL calc)    Age-related osteoporosis without current pathological fracture  -     Dexa hip/pelvis/spine*; Future    Refilled meds, check lipids and recheck DEXA scan    Options for treatment and follow-up care were reviewed with the Marge Henry  and/or guardian engaged in the decision making process and verbalized  understanding of the options discussed and agreed with the final plan.  Ursula Quintanilla MD    "

## 2021-09-14 NOTE — PATIENT INSTRUCTIONS
PERSONAL PREVENTIVE SERVICES PLAN - SERVICES     Review these tests with your medical staff then decide which ones you want and take this page home for your reference      SCREENING TESTS     Description   Year of Last Screening   Recommended Today?   Heart disease screening blood tests    Cholesterol level Reducing cholesterol can reduce your risk of heart attacks by 25%.  Screening is recommended yearly if you are at risk of heart disease otherwise every 4-5 years 4/10/2013 Yes; Recommended   Diabetes screening tests    Hemoglobin A1c blood test   Finding and treating diabetes early can reduce complications.  Screening recommended/covered yearly if you have high blood pressure, high cholesterol, obesity (BMI >30), or a history of high blood glucose tests; or 2 of the following: family history of diabetes, overweight (BMI >25 but <30), age 65 years or older, and a history of diabetes of pregnancy or gave birth to baby weighing more than 9 lbs. 9/15/20  BMP,glucose- 106.0 Yes,recommended,at physicians discretion   Hepatitis B screening Finding hepatitis B early can reduce complications.  Screening is recommended for persons with selected risk factors.  No: is not indicated today.   Hepatitis C screening Finding hepatitis C early can reduce complications.  Screening is recommended for all persons born from 1945 through 1965 and for those with selected other risk factors.   No: is not indicated today.   HIV screening Finding HIV early can reduce complications.  Screening is recommended for persons with risk factors for HIV infection.  No: is not indicated today.   Glaucoma screening Early detection of glaucoma can prevent blindness.   Please talk to your eye doctor about this.       SCREENING TESTS     Description   Year of Last Screening   Recommended Today?   Colorectal cancer screening    Fecal occult blood test     Screening colonoscopy Screening for colon cancer has been shown to reduce death from colon cancer  by 25-30%. Screening recommended to start at 50 years and continuing until age 75 years.    No: is not indicated today.   Breast Cancer Screening (women)    Mammogram Mammogram screening for breast cancer has been shown to reduce the risk of dying from breast cancer and prolong life. Screening is recommended every 1-2 years for women aged 50 to 74 years.   No: is not indicated today.   Cervical Cancer screening (women)    Pap Cervical pap smears can reduce cervical cancer. Screening is recommended annually if high risk (history of abnormal pap smears) otherwise every 2-3 years, stop screening at 65 years of age if history of normal paps.  No: is not indicated today.   Screening for Osteoporosis:  Bone mass measurements (women)    Dexa Scan Screening and treating Osteoporosis can reduce the risk of hip and spine fractures. Screening is recommended in women 65 years or older and in women and men at risk of osteoporosis. 10/3/2019  osteoporosis No: is not indicated today.   Screening for Lung Cancer     Low-dose CT scanning Screening can reduce mortality in persons aged 55-80 who have smoked at least 30 pack-years and who are either still smoking or have quit in the past 15 years.  No: is not indicated today.   Abdominal Aortic Aneurysm (AAA) screening    Ultrasound (US)   An aneurysm treated before rupture is very safe -a ruptured aneurysm can be fatal.  Screening  by US for AAA is limited to patients who meet one of the following criteria:    Men who are 65-75 years old and have smoked more than 100 cigarettes in their lifetime    Anyone with a family history of abdominal aortic aneurysm  No: is not indicated today.     Here are your recommended immunizations.  Take this home for your reference.                                                    IMMUNIZATIONS Description Recommend today?     Influenza (Flu shot) Prevents flu; should get every year Yes; Recommended    PCV 13 Pneumonia vaccination; you get it once No;  is up to date.   PPSV 23 Second pneumonia vaccination; usually get it 1 year after PCV 13 No; is up to date.   Zoster (Shingles) Prevents shingles; you get it once  (Check with Part D insurance for coverage, must receive at a pharmacy, not clinic) Yes; Recommended   Tetanus Prevents tetanus; once every 10 years No; is up to date.     Hepatitis B  If you have any of the following risk factors you should be immunized for hepatitis B: severe kidney disease, people who live in the same house as a carrier of Hepatitis B virus, people who live in  institutions (e.g. nursing homes or group homes), homosexual men, patients with hemophilia who received Factor VIII or IX concentrates, abusers of illicit injectable drugs No: is not indicated today.      PATIENT INSTRUCTIONS    Yearly exam:     See your health care provider every year in order to review changes in your health, review medicines that you take, and discuss preventive care needs such as immunizations and cancer screening.    Get a flu shot each year.     Advance Directives:    If you have not done so, you are encouraged to complete advance directives and/or a living will.   More information about advance directives can be found at: http://www.[place]med.org/advocacy/Key-Issues/Advance-Directives    Nutrition:     Eat at least 5 servings of fruits and vegetables each day.     Eat whole-grain bread, whole-wheat pasta and brown rice instead of white grains and rice.     Talk to your doctor about Calcium and Vitamin D.     Lifestyle:    Exercise for at least 150 minutes a week (30 minutes a day, 5 days a week). This will help you control your weight and prevent disease.     Limit alcohol to one drink per day.     If you smoke, try to quit - your doctor will be happy to help.     Wear sunscreen to prevent skin cancer.     See your dentist every six months for an exam and cleaning.     See your eye doctor every 1 to 2 years to screen for conditions such as glaucoma, macular  degeneration and cataracts.      Referral for :     Dexa Scan    LOCATION/PLACE/Provider :    [facility]   DATE & TIME :    Faxed to location   PHONE :     [phone]  FAX :    [phone]  Appointment made by clinic staff/:    Milagros

## 2021-09-14 NOTE — PROGRESS NOTES
Medicare Wellness Visit  Health Risk Assessment           Health Risk Assessment / Review of Systems     Constitutional: Any fevers or night sweats? No     Eyes:  Vision problems    YES -sees Ophthalmologist and Eye specialist routinely. 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    Hearing Do you feel you have hearing loss?  No     Cardiovascular: Any chest pain, fast or irregular heart beat, calf pain with walking?     No           Respiratory:   Any breathing problems or cough?   No     Gastrointestinal: Any stomach or stool problems?   No      Genitourinary: Do you have difficulty controlling urination?   No     Muscles and Joints: Any joint stiffness or soreness?   No     Skin: Any concerning lesions or moles?    YES - scaly appearance of left, outer eye. Has been applying topical cream on area.    Nervous System: Any loss of strength or feeling, numbness or tingling, shaking, dizziness, or headache?   YES - equilibrium not the same with advancement of age.    Mental Health: Any depression, anxiety or problems sleeping?    No     Cognition: Do you have any problems with your memory?  No- noticed somewhat of memory decreased related to age, not of concern.           Medical Care     What other specialists or organizations are involved in your medical care?  Eye specialist, Dr Joya. Ophthalmologist, Dr Dyson  Patient Care Team       Relationship Specialty Notifications Start End    Ursula Quintanilla MD PCP - General Family Practice  12/11/12     Phone: [phone] Fax: [phone] [address] [facility] [address]    Ursula Quintanilla MD Assigned PCP   6/6/21     Phone: [phone] Fax: [phone]         [facility] [address]          Have you been to the ER or overnight in the hospital in the last year?  No          Social History / Home Safety       Marital Status:  Who lives in your household? Self and daughter, Margaux    Do you feel  threatened or controlled by a partner, ex-partner or anyone in your life? No     Has anyone hurt you physically, for example by pushing, hitting, slapping or kicking you   or forcing you to have sex? No          Does your home have any of the following safety concerns; loose rugs in the hallway,  bathrooms with no grab bars by the tub or toilet, stairs with no handrails or poorly lit areas?  No     Do you need help with dressing yourself, bathing, eating or getting around your home?  No     Do you need help with the phone, transportation, shopping, preparing meals, housework, laundry, medications or managing money? YES - daughter completes above activities.      Risk Behaviors and Healthy Habits     History   Smoking Status     Former Smoker   Smokeless Tobacco     Never Used     How many servings of fruits and vegetables do you eat a day? 2-3 servings on average. Reviewed daily intake recommendation and encouraged increase intake when able.    Exercise: no routine but walk a lot in home     Do you frequently drive without a seatbelt? No     Do you use tobacco?  No     Do you use any other drugs? No         Do you use alcohol?Yes  Number of drinks per day : 1/2 to 1 can of beer per day. Likes to watch football and also have a beer.  Number of drinking days a week : 7      Frailty Assessment            Have you lost 10 or more pounds unintentionally in the previous year? No     How difficult is walking from one room to the other on the same level?not       Is it difficult to lift or carry something as heavy as 10 pounds?moderately      Compared with most (men/women) your age, would you say  that you are more active, less active, or about the same? same        FALL RISK ASSESSMENT 7/25/2018   Fallen 2 or more times in the past year? No   Any fall with injury in the past year? No         Advance Directives:   Discussed with patient and family as appropriate. Has patient  completed advance directives and/or a living  will? yes      Sonal Barakat RN

## 2021-09-16 ENCOUNTER — TELEPHONE (OUTPATIENT)
Dept: FAMILY MEDICINE | Facility: CLINIC | Age: 86
End: 2021-09-16

## 2021-09-16 NOTE — TELEPHONE ENCOUNTER
Patient called due to miss call yesterday 9/15 from ELAINA Powell, looked in patient chart and verbally gave results to patient, no further questions needed.

## 2021-10-04 DIAGNOSIS — M81.0 AGE-RELATED OSTEOPOROSIS WITHOUT CURRENT PATHOLOGICAL FRACTURE: ICD-10-CM

## 2021-10-04 RX ORDER — ALENDRONATE SODIUM 70 MG/1
70 TABLET ORAL
Qty: 12 TABLET | Refills: 3 | Status: SHIPPED | OUTPATIENT
Start: 2021-10-04 | End: 2022-09-23

## 2021-10-04 NOTE — TELEPHONE ENCOUNTER
Reviewed and approved      Has been referred for repeat DEXA.  Please confirm and assist with scheduling.

## 2021-12-23 ENCOUNTER — IMMUNIZATION (OUTPATIENT)
Dept: FAMILY MEDICINE | Facility: CLINIC | Age: 86
End: 2021-12-23
Payer: COMMERCIAL

## 2021-12-23 PROCEDURE — 91300 PR COVID VAC PFIZER DIL RECON 30 MCG/0.3 ML IM: CPT

## 2021-12-23 PROCEDURE — 0004A PR COVID VAC PFIZER DIL RECON 30 MCG/0.3 ML IM: CPT

## 2022-02-02 DIAGNOSIS — B02.29 NEURALGIA, POSTHERPETIC: ICD-10-CM

## 2022-02-02 RX ORDER — GABAPENTIN 400 MG/1
400-800 CAPSULE ORAL 2 TIMES DAILY
Qty: 180 CAPSULE | Refills: 3 | Status: SHIPPED | OUTPATIENT
Start: 2022-02-02 | End: 2022-07-14

## 2022-05-01 ENCOUNTER — HEALTH MAINTENANCE LETTER (OUTPATIENT)
Age: 87
End: 2022-05-01

## 2022-07-14 DIAGNOSIS — B02.29 NEURALGIA, POSTHERPETIC: ICD-10-CM

## 2022-07-14 RX ORDER — BRINZOLAMIDE/BRIMONIDINE TARTRATE 10; 2 MG/ML; MG/ML
SUSPENSION/ DROPS OPHTHALMIC
COMMUNITY
Start: 2022-06-12

## 2022-07-14 RX ORDER — GABAPENTIN 400 MG/1
400-800 CAPSULE ORAL 2 TIMES DAILY
Qty: 180 CAPSULE | Refills: 3 | Status: SHIPPED | OUTPATIENT
Start: 2022-07-14 | End: 2023-01-23

## 2022-09-06 DIAGNOSIS — I10 ESSENTIAL HYPERTENSION, BENIGN: ICD-10-CM

## 2022-09-07 RX ORDER — LISINOPRIL 20 MG/1
20 TABLET ORAL DAILY
Qty: 90 TABLET | Refills: 3 | Status: SHIPPED | OUTPATIENT
Start: 2022-09-07 | End: 2023-09-11

## 2022-09-08 NOTE — TELEPHONE ENCOUNTER
Called and spoke to Patient. Patient advised she have already spoke to Dr. Quintanilla that she do not come in for Wellness care anymore and that she is aware. She only come in when she is sick.

## 2022-09-23 DIAGNOSIS — M81.0 AGE-RELATED OSTEOPOROSIS WITHOUT CURRENT PATHOLOGICAL FRACTURE: ICD-10-CM

## 2022-09-27 RX ORDER — ALENDRONATE SODIUM 70 MG/1
70 TABLET ORAL
Qty: 12 TABLET | Refills: 3 | Status: SHIPPED | OUTPATIENT
Start: 2022-09-27 | End: 2023-08-28

## 2022-11-10 ENCOUNTER — OFFICE VISIT (OUTPATIENT)
Dept: FAMILY MEDICINE | Facility: CLINIC | Age: 87
End: 2022-11-10
Payer: COMMERCIAL

## 2022-11-10 VITALS
OXYGEN SATURATION: 98 % | BODY MASS INDEX: 22.02 KG/M2 | WEIGHT: 109 LBS | HEART RATE: 84 BPM | SYSTOLIC BLOOD PRESSURE: 174 MMHG | DIASTOLIC BLOOD PRESSURE: 90 MMHG

## 2022-11-10 DIAGNOSIS — R19.8 IRREGULAR BOWEL HABITS: ICD-10-CM

## 2022-11-10 DIAGNOSIS — Z23 NEED FOR PROPHYLACTIC VACCINATION AND INOCULATION AGAINST INFLUENZA: ICD-10-CM

## 2022-11-10 DIAGNOSIS — B02.23 POST-HERPETIC POLYNEUROPATHY: ICD-10-CM

## 2022-11-10 DIAGNOSIS — Z23 HIGH PRIORITY FOR 2019-NCOV VACCINE: ICD-10-CM

## 2022-11-10 DIAGNOSIS — B02.29 NEURALGIA, POSTHERPETIC: ICD-10-CM

## 2022-11-10 DIAGNOSIS — I10 ESSENTIAL HYPERTENSION, BENIGN: Primary | ICD-10-CM

## 2022-11-10 DIAGNOSIS — Z12.31 ENCOUNTER FOR SCREENING MAMMOGRAM FOR BREAST CANCER: ICD-10-CM

## 2022-11-10 PROCEDURE — 91312 COVID-19,PF,PFIZER BOOSTER BIVALENT: CPT | Performed by: FAMILY MEDICINE

## 2022-11-10 PROCEDURE — 0124A COVID-19,PF,PFIZER BOOSTER BIVALENT: CPT | Performed by: FAMILY MEDICINE

## 2022-11-10 PROCEDURE — G0008 ADMIN INFLUENZA VIRUS VAC: HCPCS | Performed by: FAMILY MEDICINE

## 2022-11-10 PROCEDURE — 99214 OFFICE O/P EST MOD 30 MIN: CPT | Mod: 25 | Performed by: FAMILY MEDICINE

## 2022-11-10 PROCEDURE — 90662 IIV NO PRSV INCREASED AG IM: CPT | Performed by: FAMILY MEDICINE

## 2022-11-10 NOTE — PROGRESS NOTES
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  Comment: discussed trial of slight reduction in AM gabapentin  Plan: 1 pill in AM and then continue 2 pills in PM, see if reducing dose has still control and less concerns about gait instability (concern of daughters)    (Z23) Need for prophylactic vaccination and inoculation against influenza  Comment: given  Plan: INFLUENZA, QUAD, HIGH DOSE, PF, 65YR + (FLUZONE        HD)            (Z23) High priority for 2019-nCoV vaccine  Comment: given  Plan: COVID-19,PF,PFIZER BOOSTER BIVALENT 12+Yrs            (R19.8) Irregular bowel habits  Comment: reviewed, no red flag symptoms, possibly some fructose/lactose/glucose sensitivity  Plan: psyllium (METAMUCIL/KONSYL) capsule        Trial of metamucil, encouraged more water, could consider diary of foods, could consider levbid    (B02.29) Neuralgia, postherpetic  Comment: see above  Plan: no changes, if cost becomes issue consider alternatives    (Z12.31) Encounter for screening mammogram for breast cancer  Comment: agreeable to this in 2023  Plan: ordered.                 No follow-ups on file.    Ursula Quintanilla MD  [facility]    Elaine Bird is a 87 year old accompanied by her daughter, presenting for the following health issues:  Refill Request (Gabapentin), Recheck Medication, Imm/Inj (Flu Shot), and Imm/Inj (COVID-19 VACCINE)      HPI     1. Post herpetic neuropathy:  Received papers that gabapentin will change from tier 1 to tier 2  -still has ongoing \"pains\" and still usnig as needed lidoderm and gabapentin  -frustrated that not going away    2. Irregular bowels:  Daughter notices she eats and has to go to the bathroom  Daughter wonders if \"diarrhea\" patient " "disagrees, not \"diarrhea\"  -bowels do take awhile and patient will be in their for 30 min, denies straining, but sits and reads  -possibly too many food triggers: sweets (has a sweet tooth) makes her feel more urgently to go to bathroom    3. HTN:  Always has \"white coat\" when at office, but by the end of visit has normal  -takes pills consistently  -daughter and patient aware that BP is \"Falsely elevated\" at visit    4. Wants vaccines        Review of Systems   See above, no weight loss or gain, no CNS issues      Objective    BP (!) 174/90   Pulse 84   Wt 49.4 kg (109 lb)   SpO2 98%   BMI 22.02 kg/m    Body mass index is 22.02 kg/m .  Physical Exam   GENERAL: healthy, alert and no distress  RESP: lungs clear to auscultation - no rales, rhonchi or wheezes  CV: regular rate and rhythm, normal S1 S2, no S3 or S4, no murmur, click or rub, no peripheral edema and peripheral pulses strong  ABDOMEN: soft, nontender, no hepatosplenomegaly, no masses and bowel sounds normal  MS: no gross musculoskeletal defects noted, no edema  NEURO: Normal strength and tone, mentation intact, cranial nerves 2-12 intact and DTR's normal and symmetric bilateral patellar                    "

## 2022-11-21 ENCOUNTER — HEALTH MAINTENANCE LETTER (OUTPATIENT)
Age: 87
End: 2022-11-21

## 2023-01-23 DIAGNOSIS — B02.29 NEURALGIA, POSTHERPETIC: ICD-10-CM

## 2023-01-23 RX ORDER — GABAPENTIN 400 MG/1
400-800 CAPSULE ORAL 2 TIMES DAILY
Qty: 180 CAPSULE | Refills: 3 | Status: SHIPPED | OUTPATIENT
Start: 2023-01-23 | End: 2023-11-27

## 2023-02-02 ENCOUNTER — TELEPHONE (OUTPATIENT)
Dept: FAMILY MEDICINE | Facility: CLINIC | Age: 88
End: 2023-02-02
Payer: COMMERCIAL

## 2023-02-02 NOTE — TELEPHONE ENCOUNTER
Disability form from 1/26/23 was picked up and stated that patient needed to fill out a portion and also needed patient signature. Daughter said she will bring it to patient to fill out and bring it back 2/2/23 @4:30 or 2/3/23 @4:30. Stated that we needed back to make a copy, daughter refused to give it back and will bring it.

## 2023-06-02 ENCOUNTER — HEALTH MAINTENANCE LETTER (OUTPATIENT)
Age: 88
End: 2023-06-02

## 2023-08-28 DIAGNOSIS — M81.0 AGE-RELATED OSTEOPOROSIS WITHOUT CURRENT PATHOLOGICAL FRACTURE: ICD-10-CM

## 2023-08-28 RX ORDER — ALENDRONATE SODIUM 70 MG/1
70 TABLET ORAL
Qty: 12 TABLET | Refills: 3 | Status: SHIPPED | OUTPATIENT
Start: 2023-08-28 | End: 2024-08-30

## 2023-09-11 DIAGNOSIS — I10 ESSENTIAL HYPERTENSION, BENIGN: ICD-10-CM

## 2023-09-11 RX ORDER — LISINOPRIL 20 MG/1
20 TABLET ORAL DAILY
Qty: 90 TABLET | Refills: 3 | Status: SHIPPED | OUTPATIENT
Start: 2023-09-11 | End: 2024-09-05

## 2023-09-11 NOTE — TELEPHONE ENCOUNTER
[facility] phone call message- medication clarification/question:    Full Medication Name: lisinopril (ZESTRIL) 20 MG tablet     Question: Patient called requesting for medication refill, if able to fill please send to pharmacy thank you.    Pharmacy confirmed as [id number] - [place], [place] - [address]: Yes    OK to leave a message on voice mail? Yes    Primary language: English      needed? No    Call taken on September 11, 2023 at 1:15 PM by Iraj Salinas

## 2023-11-26 ENCOUNTER — HEALTH MAINTENANCE LETTER (OUTPATIENT)
Age: 88
End: 2023-11-26

## 2023-11-27 DIAGNOSIS — B02.29 NEURALGIA, POSTHERPETIC: ICD-10-CM

## 2023-11-27 RX ORDER — GABAPENTIN 400 MG/1
400-800 CAPSULE ORAL 2 TIMES DAILY
Qty: 180 CAPSULE | Refills: 3 | Status: SHIPPED | OUTPATIENT
Start: 2023-11-27 | End: 2024-02-20

## 2023-12-01 ENCOUNTER — TELEPHONE (OUTPATIENT)
Dept: FAMILY MEDICINE | Facility: CLINIC | Age: 88
End: 2023-12-01
Payer: COMMERCIAL

## 2023-12-01 NOTE — TELEPHONE ENCOUNTER
[facility] phone call message- general phone call:    Reason for call: Daughter called to schedule for annual wellness but daughter has concerns about medication. If can please refill her medications (did not stated what kind of medication) that she needs prior to her appointment, since they are booked out until February 1st per caller. Please call and advise, if needed. Thank you    Return call needed: Yes    OK to leave a message on voice mail? Yes    Primary language: English      needed? No    Call taken on December 1, 2023 at 11:19 AM by Crhistopher Flores

## 2024-02-15 ENCOUNTER — OFFICE VISIT (OUTPATIENT)
Dept: FAMILY MEDICINE | Facility: CLINIC | Age: 89
End: 2024-02-15
Payer: COMMERCIAL

## 2024-02-15 DIAGNOSIS — B02.29 POSTHERPETIC NEURALGIA: ICD-10-CM

## 2024-02-15 DIAGNOSIS — B02.29 NEURALGIA, POSTHERPETIC: ICD-10-CM

## 2024-02-15 DIAGNOSIS — Z12.31 VISIT FOR SCREENING MAMMOGRAM: ICD-10-CM

## 2024-02-15 DIAGNOSIS — E43 UNSPECIFIED SEVERE PROTEIN-CALORIE MALNUTRITION (H): ICD-10-CM

## 2024-02-15 DIAGNOSIS — Z00.00 MEDICARE ANNUAL WELLNESS VISIT, SUBSEQUENT: Primary | ICD-10-CM

## 2024-02-15 DIAGNOSIS — L57.0 ACTINIC KERATOSIS: ICD-10-CM

## 2024-02-15 DIAGNOSIS — Z00.00 WELLNESS EXAMINATION: Primary | ICD-10-CM

## 2024-02-15 PROCEDURE — 99214 OFFICE O/P EST MOD 30 MIN: CPT | Mod: 25 | Performed by: FAMILY MEDICINE

## 2024-02-15 PROCEDURE — 99207 PR NO BILLABLE SERVICE THIS VISIT: CPT

## 2024-02-15 PROCEDURE — 99397 PER PM REEVAL EST PAT 65+ YR: CPT | Performed by: FAMILY MEDICINE

## 2024-02-15 SDOH — HEALTH STABILITY: PHYSICAL HEALTH: ON AVERAGE, HOW MANY DAYS PER WEEK DO YOU ENGAGE IN MODERATE TO STRENUOUS EXERCISE (LIKE A BRISK WALK)?: 0 DAYS

## 2024-02-15 ASSESSMENT — SOCIAL DETERMINANTS OF HEALTH (SDOH): HOW OFTEN DO YOU GET TOGETHER WITH FRIENDS OR RELATIVES?: THREE TIMES A WEEK

## 2024-02-15 NOTE — COMMUNITY RESOURCES LIST (ENGLISH)
02/15/2024   [facility]  N/A  For additional resource needs, please contact your health insurance member services or your primary care team.  Phone: [phone]   Email: N/A   Address: [address] [place] [id number]   Hours: N/A        Exercise and Recreation       Gym or workout facility  1  [facility] [place] [facility] - Open Gym Distance: 0.99 miles      In-Person   [address] [place] [address]  Language: English  Hours: Mon - Thu 2:00 PM - 9:00 PM , Fri 2:00 PM - 6:00 PM , Sat 1:00 PM - 5:00 PM  Fees: Free, Self Pay   Phone: (848) 134-9852 Email: [email or web address]. Website: [email or web address]/facilities/[email or web address]-[email or web address]-[email or web address]     2  [employer] [facility] Distance: 0.99 miles      In-Person   [address] [place] [address]  Language: English  Hours: Mon - Sun Open 24 Hours  Fees: Insurance, Self Pay, Sliding Fee   Phone: (724) 915-1939 Email: glen@[employer] Website: [email or web address]://[email or web address]/gyms/467/[facility]-[address]/          Important Numbers & Websites       [address].org  Poison Control   (472) 208-5731 [place]po[place]  Suicide and Crisis Lifeline   [address] [place], [place] [address]line.org  Childhelp National Child Abuse Hotline   296.814.7501 Childhelphotline.org  National Sexual Assault Hotline   (349) 385-4552 (HOPE) Rainn.org  National Runaway Safeline   (280) 268-4071 (RUNAWAY) [facility].org  Pregnancy & Postpartum Support [place]   Call/text 123-857-1258 Ppsupport[place].org  Substance Abuse National Helpline ([facility]   334-541-HELP (8365) Findtreatment.gov  Emergency Services   911

## 2024-02-19 ENCOUNTER — TELEPHONE (OUTPATIENT)
Dept: FAMILY MEDICINE | Facility: CLINIC | Age: 89
End: 2024-02-19
Payer: COMMERCIAL

## 2024-02-19 DIAGNOSIS — Z29.11 ENCOUNTER FOR PROPHYLACTIC IMMUNOTHERAPY FOR RESPIRATORY SYNCYTIAL VIRUS (RSV): Primary | ICD-10-CM

## 2024-02-19 NOTE — TELEPHONE ENCOUNTER
[facility] phone call message- medication clarification/question:    Full Medication Name:     FACE CREAM       Dose:   N/A      Question:     Daughter called and wanted to know if Dr. Quintanilla send in medication. I advise pt that she did not placed any medication when she last saw her..     Daughter advise that Dr. Quintanilla advise that she will place in a order for a face cream to her mother and a vaccine?    Please send in medication and send in vaccine that she is needing at the pharmacy and or call to advise. Thank you.     Per Daughter would like a call for an update if possible. Thanks.     Pharmacy confirmed as [id number] - [place], [place] - [address]: Yes    OK to leave a message on voice mail? Yes    Primary language: English      needed? No    Call taken on February 19, 2024 at 8:27 AM by Fior Solares

## 2024-02-20 RX ORDER — IMIQUIMOD 12.5 MG/.25G
CREAM TOPICAL
Qty: 8 PACKET | Refills: 3 | Status: SHIPPED | OUTPATIENT
Start: 2024-02-20

## 2024-02-20 RX ORDER — RESPIRATORY SYNCYTIAL VIRUS VACCINE 120MCG/0.5
0.5 KIT INTRAMUSCULAR ONCE
Qty: 0.5 ML | Refills: 0 | Status: SHIPPED | OUTPATIENT
Start: 2024-02-20 | End: 2024-02-20

## 2024-02-20 RX ORDER — GABAPENTIN 300 MG/1
CAPSULE ORAL
Qty: 270 CAPSULE | Refills: 3 | Status: SHIPPED | OUTPATIENT
Start: 2024-02-20

## 2024-02-20 NOTE — TELEPHONE ENCOUNTER
"Called and talked with patient.    Low salt: consider slight increase in cheese daily \"she likes that\" and or saltine cracker.    Tolerating the lower evening only dose of gabapentin.    Use up current 400 mg capsules and then new dose of 300 mg capsule will be the next transition.    Cream sent to pharmacy.  "

## 2024-02-20 NOTE — PROGRESS NOTES
Annual Wellness Visit for 65 years and older    HPI  This 89 year old female presents as an established patient  Ursula Quintanilla who presents for an annual wellness visit.    Other issues patient wants to be addressed today:  No chief complaint on file.  Has a spot on left temple wonders if a cream can help remove the rough patch    Patient Active Problem List   Diagnosis    Primary angle-closure glaucoma    Essential hypertension, benign    Irritable bowel syndrome    Osteoarthrosis, unspecified whether generalized or localized, pelvic region and thigh    Osteoarthrosis, unspecified whether generalized or localized, involving lower leg    Health Care Home    Shingles    Postherpetic neuralgia    Personal history of urinary tract infection    Frail elderly     Past Medical History:   Diagnosis Date    Essential hypertension, benign 1/2/2013    Irritable bowel syndrome 1/2/2013    Osteoarthrosis, unspecified whether generalized or localized, pelvic region and thigh 1/2/2013    Primary angle-closure glaucoma, unspecified(365.20) 1/2/2013       Family History   Problem Relation Age of Onset    Cancer No family hx of     Diabetes No family hx of     Heart Disease No family hx of     Coronary Artery Disease No family hx of      Problem List, Family History and past Medical History reviewed and  unchanged/updated.          2/15/2024   Fall Risk   Fallen 2 or more times in the past year? No    No   Trouble with walking or balance? No    No      Frailty Assessment    1. Weight loss (>5% in year)  No  Wt Readings from Last 5 Encounters:   02/15/24 49.4 kg (109 lb)   11/10/22 49.4 kg (109 lb)   09/14/21 50.8 kg (112 lb)   10/16/20 47.5 kg (104 lb 12.8 oz)   09/15/20 46.6 kg (102 lb 12.8 oz)       2. Exhaustion (perceived effort for a given activity)   How difficult is walking from one room to the other on the same level?mildly   No     3. Weakness (handgrip strength)   How difficult is lifting or carrying something as  heavy as 10 pounds? moderately   Yes    4. Decreased physical activity    Daughter noticing same in her activity No    5. Slow gait speed (timed up and go > 12 sec.)  Yes      2/15/2024     3:41 PM 2/15/2024     3:34 PM 9/14/2021     4:16 PM 7/25/2018     2:50 PM   FALL RISK ASSESSMENT   Fallen 2 or more times in the past year? No No No No   Any fall with injury in the past year?   No No   Timed Up and Go Test/Seconds (13.5 is a fall risk; contact physician)   24          Frailty screen score: 2    Frail Assessment:1-2 Prefrail     EVALUATION OF COGNITIVE FUNCTION  Mood/affect:Normal  Appearance:Normal  Family member/caregiver input: Family still comfortable with her current living and with their level of supervision and assistance    PHQ-2 Score:       2/15/2024     3:35 PM 11/10/2022     3:05 PM   PHQ-2 ( 1999 Pfizer)   Q1: Little interest or pleasure in doing things 0 0   Q2: Feeling down, depressed or hopeless 0 0   PHQ-2 Score 0    0 0   Q1: Little interest or pleasure in doing things Not at all    Q2: Feeling down, depressed or hopeless Not at all    PHQ-2 Score 0        PHQ-9 Score:        No data to display                Other Assessments:  CV Risk based on Pooled Cohort Risk   The ASCVD Risk score (Elvia DK, et al., 2019) failed to calculate for the following reasons:    The 2019 ASCVD risk score is only valid for ages 40 to 79        ECG (if done)not performed  Hearing evaluation if done: No        Advance Directives: Discussed with patient and family as appropriate. Has patient  completed advance directives and/or a living will?  Have had conversation and have documented her DNR/DNI care wishes      Immunization History   Administered Date(s) Administered    COVID-19 12+ (2023-24) (Pfizer) 02/15/2024    COVID-19 Bivalent 12+ (Pfizer) 11/10/2022    COVID-19 MONOVALENT 12+ (Pfizer) 03/12/2021, 04/02/2021, 12/23/2021    Influenza (High Dose) 3 valent vaccine 01/05/2017, 11/21/2019    Influenza (IIV3) PF  "02/07/2011    Influenza Vaccine 65+ (Fluzone HD) 10/16/2020, 09/14/2021, 11/10/2022, 02/15/2024    Pneumo Conj 13-V (2010&after) 07/19/2017    Pneumococcal 23 valent 04/18/2006    TDAP (Adacel,Boostrix) 04/18/2006    TDAP Vaccine (Boostrix) 07/19/2017     Reviewed Immunization Record Today  Physical Exam  There were no vitals taken for this visit.  Vitals: There were no vitals taken for this visit.  BMI= There is no height or weight on file to calculate BMI.  EXAM:  Constitutional: alert, no distress, and missing all teeth, talkative and \"feisty\"   Respiratory: CTAB no wheezes, rales  SKIN: left temple with 3mm scaly superficial spot, slight hyperpigmentation  COR: RRR no leg edema    Assessment and Plan:  (Z00.00) Medicare annual wellness visit, subsequent  (primary encounter diagnosis)  Comment: continues to be independent with family support  Plan: no changes, recheck one year and as needed    (L57.0) Actinic keratosis  Comment: discussed cryo vs. Topical cream  and patient wanting cream  Plan: Aldara cream apply twice weekly until resolved    (B02.29) Postherpetic neuralgia  Comment: to reduce some mild confusion/fall risk/ will reduce lyrica dose during the day and see if tolerates  Plan: lyrica change from 400-800 BID down to 300 mg Q AM prn and 600 mg Q HS.    (Z12.31) Visit for screening mammogram  Comment: healthy and normal breast exam will consider  Plan: referral if wanting mammogram    (E43) Unspecified severe protein-calorie malnutrition (H24)  Comment: checking vitamin d  Plan: maintain for fraility and osteoporosis        Reviewed Preventive Services and Plan form with patient as specified in  Patient Instructions.  Positive findings on assessment: frailty  Diagnoses and all orders for this visit:    Medicare annual wellness visit, subsequent    Actinic keratosis    Postherpetic neuralgia    Visit for screening mammogram    Unspecified severe protein-calorie malnutrition (H24)    Neuralgia, " postherpetic        Options for treatment and follow-up care were reviewed with the Marge Henry  and/or guardian engaged in the decision making process and verbalized  understanding of the options discussed and agreed with the final plan.  Ursula Quintanilla MD

## 2024-02-28 ENCOUNTER — TELEPHONE (OUTPATIENT)
Dept: FAMILY MEDICINE | Facility: CLINIC | Age: 89
End: 2024-02-28
Payer: COMMERCIAL

## 2024-02-28 NOTE — TELEPHONE ENCOUNTER
Prior Authorization Retail Medication Request    Medication/Dose: Lmiquimod 5% cream  Diagnosis and ICD code (if different than what is on RX):    New/renewal/insurance change PA/secondary ins. PA:  Previously Tried and Failed:    Rationale:      Insurance   Primary:   Insurance ID:  M:  [id number]    Secondary (if applicable):  Insurance ID:      Pharmacy Information (if different than what is on RX)  Name:    Phone:    Fax:

## 2024-03-05 DIAGNOSIS — M81.0 AGE-RELATED OSTEOPOROSIS WITHOUT CURRENT PATHOLOGICAL FRACTURE: ICD-10-CM

## 2024-03-05 RX ORDER — ALENDRONATE SODIUM 70 MG/1
70 TABLET ORAL
Qty: 12 TABLET | Status: CANCELLED | OUTPATIENT
Start: 2024-03-05

## 2024-03-05 NOTE — TELEPHONE ENCOUNTER
Patient called in regards to her medication. The lotion that was prescribed for her face is in current PA and she is wondering why it needs a PA and what is in it. She also needs her bone pill per patient. Please advise the patient when able. Thank you.  Valeria Jama on 3/5/2024 at 8:47 AM

## 2024-03-12 NOTE — TELEPHONE ENCOUNTER
Central Prior Authorization Team  Phone: [phone]    PA Initiation    Medication: IMIQUIMOD 5 % EX CREA  Insurance Company: [employer] - Phone [phone] Fax [phone]  Pharmacy Filling the Rx: [id number] - [place], [place] - [address]  Filling Pharmacy Phone: [phone]  Filling Pharmacy Fax:    Start Date: 3/12/2024

## 2024-03-13 NOTE — TELEPHONE ENCOUNTER
Prior Authorization Approval    Medication: IMIQUIMOD 5 % EX CREA  Authorization Effective Date: 1/1/2024  Authorization Expiration Date: 7/14/2024  Approved Dose/Quantity:   Reference #:     Insurance Company: [employer] Review - Phone [phone] Fax [phone]  Expected CoPay: $    CoPay Card Available:      Financial Assistance Needed:   Which Pharmacy is filling the prescription: [id number] - [place], [place] - [address]  Pharmacy Notified: Yes  Patient Notified: **Instructed pharmacy to notify patient when script is ready to /ship.**

## 2024-03-25 NOTE — TELEPHONE ENCOUNTER
"Given severity of baseline T score (-5.4), would at minimum recommend repeat DXA to determine whether eligible for holiday, however based on current guidelines would think about a treatment duration of 6-10 years given \"very high fracture risk\"    Very high fracture risk definition:    Recent fx (< 12 months), fx on osteoporosis therapy, multiple fx, fx 2/2 medication (steroids), very low T-score (< ?3.0), high risk for falls or history of injurious falls, and very high FRAX (MOF >30%, hip fx >4.5%) I.e. Tscore <-3)    Current AACE guidelines:   R36. For oral bisphosphonates, consider a bisphosphonate holiday after 5 years of treatment if fracture risk is no longer high (such as when the T score is greater than -2.5, or the patient  has remained fracture free), but continue treatment up to an additional 5 years if fracture risk remains high (Grade B; BEL 2).    R37. For oral bisphosphonates, consider a bisphosphonate holiday after 6 to 10 years of  stability in patients with very high fracture risk (Grade B; BEL 2).     Estimated Creatinine Clearance: 35.4 mL/min (based on SCr of 0.84 mg/dL).      Edith Alvarez, Pharm.D., CDC[facility]  Phone: [phone]  March 25, 2024 at 1:28 PM    "

## 2024-03-28 NOTE — TELEPHONE ENCOUNTER
"Called.      Cream was authorized.  Need bone density test to determine if should continue on \"bone pill\" --I will place referral and can call clinic if not getting a phone call to schedule the bone density test.  "

## 2024-06-23 ENCOUNTER — HEALTH MAINTENANCE LETTER (OUTPATIENT)
Age: 89
End: 2024-06-23

## 2024-09-05 DIAGNOSIS — I10 ESSENTIAL HYPERTENSION, BENIGN: ICD-10-CM

## 2024-09-05 RX ORDER — LISINOPRIL 20 MG/1
20 TABLET ORAL DAILY
Qty: 90 TABLET | Refills: 0 | Status: SHIPPED | OUTPATIENT
Start: 2024-09-05 | End: 2024-09-16

## 2024-09-15 DIAGNOSIS — I10 ESSENTIAL HYPERTENSION, BENIGN: ICD-10-CM

## 2024-09-16 RX ORDER — LISINOPRIL 20 MG/1
20 TABLET ORAL DAILY
Qty: 90 TABLET | Refills: 0 | Status: SHIPPED | OUTPATIENT
Start: 2024-09-16

## 2024-10-11 NOTE — PROGRESS NOTES
Annual Wellness Visit for 65 years and older    HPI  This 83 year old female presents as an established patient  Ursula Quintanilla who presents for an annual wellness visit.    Other issues patient wants to be addressed today:  Chief Complaint   Patient presents with     Wellness Visit     Medication Reconciliation     completed.      Several UTIs over the last years, 3x over last two years, wants meds to have at home for it  Doesn't want colonoscopy, but maybe FIT?  Immodium for diarrhea works well, but always had bad gas - wondering if there's anything they need to do    Patient Active Problem List   Diagnosis     Primary angle-closure glaucoma     Essential hypertension, benign     Irritable bowel syndrome     Osteoarthrosis, unspecified whether generalized or localized, pelvic region and thigh     Osteoarthrosis, unspecified whether generalized or localized, involving lower leg     Health Care Home     Shingles     Postherpetic neuralgia     Past Medical History:   Diagnosis Date     Essential hypertension, benign 1/2/2013     Irritable bowel syndrome 1/2/2013     Osteoarthrosis, unspecified whether generalized or localized, pelvic region and thigh 1/2/2013     Primary angle-closure glaucoma, unspecified(365.20) 1/2/2013       Family History   Problem Relation Age of Onset     Cancer No family hx of      Diabetes No family hx of      HEART DISEASE No family hx of      Coronary Artery Disease No family hx of      Problem List, Family History and past Medical History reviewed and  unchanged/updated.    Nursing Notes:   Sonal Barakat RN  7/25/2018  3:04 PM  Signed  Medicare Wellness Visit  Health Risk Assessment           Health Risk Assessment / Review of Systems     Constitutional: Any fevers or night sweats? No     Eyes:  Vision problems   No- sees Opthalmologist every 2 months. 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    Hearing Do you feel you have hearing loss?  No     Cardiovascular: Any chest pain, fast or irregular  heart beat, calf pain with walking?     No           Respiratory:   Any breathing problems or cough?   No     Gastrointestinal: Any stomach or stool problems?   No - no concerns, take Imodium daily to stay regular.    Genitourinary: Do you have difficulty controlling urination?   No - currently does not have any symptoms or concerns. Would like to request for antibiotics to have on hand in case she becomes symptomatic. Hx- last year had 3 UTIs.    Muscles and Joints: Any joint stiffness or soreness?   No     Skin: Any concerning lesions or moles?   No     Nervous System: Any loss of strength or feeling, numbness or tingling, shaking, dizziness, or headache?  No     Mental Health: Any depression, anxiety or problems sleeping?    No     Cognition: Do you have any problems with your memory?  No            Medical Care     What other specialists or organizations are involved in your medical care?  Opthalmologist  Patient Care Team       Relationship Specialty Notifications Start Ursula Ledesma MD PCP - General Family Practice  12/11/12     Phone: [phone] Fax: [phone]         [facility] [address]          Have you been to the ER or overnight in the hospital in the last year?  No          Social History / Home Safety       Marital Status:  Who lives in your household?  Lives with daughter Margaux and Zulyn trae.    Do you feel threatened or controlled by a partner, ex-partner or anyone in your life? No     Has anyone hurt you physically, for example by pushing, hitting, slapping or kicking you   or forcing you to have sex? No          Does your home have any of the following safety concerns; loose rugs in the hallway,  bathrooms with no grab bars by the tub or toilet, stairs with no handrails or poorly lit areas?  No     Do you need help with dressing yourself, bathing, eating or getting around your home?  No     Do you need help with the phone, transportation, shopping,  preparing meals, housework, laundry, medications or managing money? YES - Marge folds clothes but daughter will wash/dry her clothes. Washer and dryer is located down in basement. Daughter had mentioned relocating washer and dryer to be upstairs, easier for use.      Risk Behaviors and Healthy Habits     History   Smoking Status     Former Smoker   Smokeless Tobacco     Never Used     How many servings of fruits and vegetables do you eat a day? 2-3 servings a day. Advised and encouraged to try to increase intake to 5 servings a day. Marge states she is not concerned as she takes her vitamins.    Exercise: keeps self busy walking in yard and house only, uses a cane as assistive device for walking. Otherwise no routine.     Do you frequently drive without a seatbelt? No     Do you use tobacco?  No     Do you use any other drugs? No         Do you use alcohol?Yes  Number of drinks per day - one  Number of drinking days a week -  7 days a week around 4-5 pm with dinner.      Frailty Assessment            Have you lost 10 or more pounds unintentionally in the previous year? No     How difficult is walking from one room to the other on the same level? not       Is it difficult to lift or carry something as heavy as 10 pounds? Severely- daughter no longer will allow Marge to lift or carry items.      Compared with most (men/women) your age, would you say  that you are more active, less active, or about the same?  same        FALL RISK ASSESSMENT 7/25/2018   Fallen 2 or more times in the past year? No   Any fall with injury in the past year? No     TImed up and go test- 15 seconds    Advance Directives:   Discussed with patient and family as appropriate. Has patient  completed advance directives and/or a living will? yes      JU Abreu Cheng, RANDAL  7/25/2018  3:03 PM  Signed    Chief Complaint   Patient presents with     Wellness Visit     Medication Reconciliation     completed.        /70   "Pulse 87  Temp 98.5  F (36.9  C) (Oral)  Ht 4' 11.06\" (150 cm)  Wt 113 lb (51.3 kg)  SpO2 94%  BMI 22.78 kg/m2        FOR WOMEN  What year did you stop having periods? Usual 50s  Any vaginal bleeding in the last year? No  Have you ever had an abnormal Pap smear? No      Frailty Assessment    1. Weight loss (>5% in year)  Yes  Wt Readings from Last 5 Encounters:   07/25/18 113 lb (51.3 kg)   07/19/17 123 lb 9.6 oz (56.1 kg)   01/05/17 121 lb (54.9 kg)   03/02/16 118 lb 3.2 oz (53.6 kg)   10/30/15 122 lb 9.6 oz (55.6 kg)       2. Exhaustion (perceived effort for a given activity)   How difficult is walking from one room to the other on the same level?not   No     3. Weakness (handgrip strength)   How difficult is lifting or carrying something as heavy as 10 pounds? mildly   No    4. Decreased physical activity    Compared with most (men/women) your age, would you say  that you are more active, less active, or about the same? more   No    5. Slow gait speed (timed up and go > 12 sec.)  Yes  FALL RISK ASSESSMENT 7/25/2018   Fallen 2 or more times in the past year? No   Any fall with injury in the past year? No         Frailty screen score: 1    Frail Assessment:1-2 Prefrail         EVALUATION OF COGNITIVE FUNCTION  Mood/affect:Normal  Appearance:Normal  Family member/caregiver input: Normal    PHQ-2 Score:   PHQ-2 ( 1999 Pfizer) 7/25/2018 1/5/2017   Q1: Little interest or pleasure in doing things 0 3   Q2: Feeling down, depressed or hopeless 0 3   PHQ-2 Score 0 6       PHQ-9 Score:   No flowsheet data found.    Mini Cog Test:      Recall result:  2 points   Clock Draw Test result: Normal    Cognitive screen is:Negative      Other Assessments:  CV Risk based on Pooled Cohort Risk   The ASCVD Risk score (Virden DEYANIRA Jr, et al., 2013) failed to calculate for the following reasons:    The 2013 ASCVD risk score is only valid for ages 40 to 79          ECG (if done)not performed      Advance Directives: Discussed with " "patient and family as appropriate. Has patient  completed advance directives and/or a living will? yes      Immunization History   Administered Date(s) Administered     Influenza (High Dose) 3 valent vaccine 01/05/2017     Influenza (IIV3) PF 02/07/2011     Pneumo Conj 13-V (2010&after) 07/19/2017     Pneumococcal 23 valent 04/18/2006     TDAP Vaccine (Boostrix) 07/19/2017     Tdap (Adacel,Boostrix) 04/18/2006     Reviewed Immunization Record Today  Physical Exam    Vitals: /70  Pulse 87  Temp 98.5  F (36.9  C) (Oral)  Ht 4' 11.06\" (150 cm)  Wt 113 lb (51.3 kg)  SpO2 94%  BMI 22.78 kg/m2  BMI= Body mass index is 22.78 kg/(m^2).  EXAM:  Gen: alert, oriented X 3, no acute distress, no sign of discomfort      Assessment and Plan:    Reviewed Preventive Services and Plan form with patient as specified in  Patient Instructions.  Positive findings on assessment: frequent UTI, patient has a slower gait  There are no diagnoses linked to this encounter.    Options for treatment and follow-up care were reviewed with the Marge Blackburnni  and/or guardian engaged in the decision making process and verbalized  understanding of the options discussed and agreed with the final plan.  Ursula Quintanilla MD  " Parent

## 2024-11-18 DIAGNOSIS — M81.0 AGE-RELATED OSTEOPOROSIS WITHOUT CURRENT PATHOLOGICAL FRACTURE: ICD-10-CM

## 2024-11-18 RX ORDER — ALENDRONATE SODIUM 70 MG/1
TABLET ORAL
Qty: 12 TABLET | Refills: 0 | Status: SHIPPED | OUTPATIENT
Start: 2024-11-18

## 2024-11-18 NOTE — TELEPHONE ENCOUNTER
Filling per RN protocol. Routing to PCP to review and order DEXA if appropriate. DEXA required for RNs to continue filling medication per RN standing orders. Segun DODD

## 2024-11-18 NOTE — TELEPHONE ENCOUNTER
Unable to fill due to contraindication on patient chart for dosing, routing to PCP to review and fill  Significance: Contraindicated     Patient Information  Age: 89 year old (32,851 days)  Weight: 49.4 kg [Weight as of 2/15/2024]  Sex: Female  Creatinine Clearance: 35 mL/min (Serum Creatinine: 0.84 mg/dL)  Estimated Glomerular Filtration Rate: 66 mL/min/1.73m2 (Serum Creatinine: 0.84 mg/dL)  Diagnoses used to match Medical Conditions: Age-related osteoporosis without current pathological fracture      The medication is contraindicated.    Other Information  Ordering Mode: Outpatient  Route: Oral and equivalent    Conditions Single Dose Daily Dose Frequency Duration Of Therapy Custom Rule?   Patient Sig:   TAKE 1 TABLET (70 MG) BY MOUTH EVERY 7 DAYS TAKE ON AN EMPTY STOMACH WITH OVER 8 OUNCES WATER AND STAY UPRIGHT FOR AT LEAST 30 MINUTES AFTER DOSE   Age: >= 17 years, Creatinine Clearance: <= 35 mL/min contraindicated contraindicated <=0 doses/day  Yes   Age: >= 17 years 35 - 70 mg    Yes   Age: 3 - 4 years (1,095 - 1,459 days), Weight: 45 - 54.99 kg, Route: Oral, Medical Condition: Osteoporosis, Creatinine Clearance: 35 - 59.9999 mL/min/1.73m2, Dose Type: Maintenance Dose <=50 mg 7.14 - 7.145 mg 0.1428 - 0.1429 doses/day >=1 days No   Age: 4 - 11 years (1,460 - 4,014 days), Weight: 45 - 54.99 kg, Route: Oral, Medical Condition: Osteoporosis, Creatinine Clearance: 35 - 59.9999 mL/min/1.73m2, Dose Type: Maintenance Dose <=50 mg 7.14 - 10 mg 0.1428 - 1 doses/day >=1 days No   Age: 11 - 18 years (4,015 - 6,569 days), Weight: 45 - 54.99 kg, Route: Oral, Medical Condition: Osteoporosis, Creatinine Clearance: 35 - 59.9999 mL/min/1.73m2, Dose Type: Maintenance Dose <=70 mg 7.14 - 10.003 mg 0.1428 - 1 doses/day >=1 days No   Age: 3 - 4 years (1,095 - 1,459 days), Weight: 45 - 54.99 kg, Route: Oral, Creatinine Clearance: 35 - 59.9999 mL/min/1.73m2, Dose Type: Maintenance Dose <=50 mg 7.14 - 10 mg 0.1428 - 1 doses/day >=1  days No   Age: 3 - 4 years (1,095 - 1,459 days), Weight: 45 - 54.99 kg, Route: Oral, Medical Condition: Osteoporosis, Dose Type: Maintenance Dose <=50 mg 7.14 - 7.145 mg 0.1428 - 0.1429 doses/day >=1 days No   Age: 4 - 11 years (1,460 - 4,014 days), Weight: 45 - 54.99 kg, Route: Oral, Creatinine Clearance: 35 - 59.9999 mL/min/1.73m2, Dose Type: Maintenance Dose <=50 mg 7.14 - 10 mg 0.1428 - 1 doses/day >=1 days No   Age: 4 - 11 years (1,460 - 4,014 days), Weight: 45 - 54.99 kg, Route: Oral, Medical Condition: Osteoporosis, Dose Type: Maintenance Dose <=50 mg 7.14 - 10 mg 0.1428 - 1 doses/day >=1 days No   Age: 11 - 18 years (4,015 - 6,569 days), Weight: 45 - 54.99 kg, Route: Oral, Creatinine Clearance: 35 - 59.9999 mL/min/1.73m2, Dose Type: Maintenance Dose <=70 mg 7.14 - 10.003 mg 0.1428 - 1 doses/day >=1 days No   Age: 11 - 18 years (4,015 - 6,569 days), Weight: 45 - 54.99 kg, Route: Oral, Medical Condition: Osteoporosis, Dose Type: Maintenance Dose <=70 mg 7.14 - 10.003 mg 0.1428 - 1 doses/day >=1 days No   Age: 18 - 274 years (6,570 - 99,999 days), Route: Oral, Medical Condition: Osteoporosis, Creatinine Clearance: 26 - 59.9999 mL/min, Dose Type: Maintenance Dose <=70 mg 4.998 - 10.003 mg 0.1428 - 1 doses/day >=1 days No   Age: 2 - 3 years (730 - 1,094 days), Route: Oral, Creatinine Clearance: 35 - 59.9999 mL/min/1.73m2, Dose Type: Maintenance Dose <=5 mg 5 mg 1 doses/day >=1 days No   Age: 3 - 4 years (1,095 - 1,459 days), Weight: 45 - 54.99 kg, Route: Oral, Dose Type: Maintenance Dose <=50 mg 7.14 - 10 mg 0.1428 - 1 doses/day >=1 days No   Age: 4 - 11 years (1,460 - 4,014 days), Weight: 45 - 54.99 kg, Route: Oral, Dose Type: Maintenance Dose <=50 mg 7.14 - 10 mg 0.1428 - 1 doses/day >=1 days No   Age: 11 - 18 years (4,015 - 6,569 days), Weight: 45 - 54.99 kg, Route: Oral, Dose Type: Maintenance Dose <=70 mg 7.14 - 10.003 mg 0.1428 - 1 doses/day >=1 days No   Age: 18 - 274 years (6,570 - 99,999 days), Route:  Oral, Creatinine Clearance: 26 - 59.9999 mL/min, Dose Type: Maintenance Dose <=70 mg 4.998 - 40 mg 0.1428 - 1 doses/day >=1 days No   Age: 18 - 274 years (6,570 - 99,999 days), Route: Oral, Medical Condition: Osteoporosis, Dose Type: Maintenance Dose <=70 mg 4.998 - 10.003 mg 0.1428 - 1 doses/day >=1 days No   Age: 2 - 3 years (730 - 1,094 days), Route: Oral, Dose Type: Maintenance Dose <=5 mg 5 mg 1 doses/day >=1 days No   Age: 18 - 274 years (6,570 - 99,999 days), Route: Oral, Dose Type: Maintenance Dose <=70 mg 4.998 - 40 mg 0.1428 - 1 doses/day >=1 days No

## 2024-12-06 ENCOUNTER — TELEPHONE (OUTPATIENT)
Dept: FAMILY MEDICINE | Facility: CLINIC | Age: 89
End: 2024-12-06
Payer: COMMERCIAL

## 2024-12-09 NOTE — TELEPHONE ENCOUNTER
Forms/Letter Request    Type of form/letter: DMV/Handicap Radha 12/04/2024      Is Release of Information needed?: No    Do we have the form/letter: Yes:     Who is the form from? Patient    Where did/will the form come from? Patient or family brought in       When is form/letter needed by:  Soon    How would you like the form/letter returned: , Call [phone] (Katrin Quiroga) for     Patient Notified form requests are processed in 5-7 business days:Yes    Could we send this information to you [facility] or would you prefer to receive a phone call?:   No preference   Okay to leave a detailed message?: N/A

## 2024-12-10 NOTE — TELEPHONE ENCOUNTER
I called Katrin [phone] for  per note on form. Form is placed at  drawer. Copy made in CC completed bin.

## 2025-01-08 ENCOUNTER — PATIENT OUTREACH (OUTPATIENT)
Dept: CARE COORDINATION | Facility: CLINIC | Age: OVER 89
End: 2025-01-08
Payer: COMMERCIAL

## 2025-01-16 ENCOUNTER — PATIENT OUTREACH (OUTPATIENT)
Dept: CARE COORDINATION | Facility: CLINIC | Age: OVER 89
End: 2025-01-16
Payer: COMMERCIAL

## 2025-01-30 ENCOUNTER — PATIENT OUTREACH (OUTPATIENT)
Dept: CARE COORDINATION | Facility: CLINIC | Age: OVER 89
End: 2025-01-30
Payer: COMMERCIAL

## 2025-03-03 RX ORDER — DORZOLAMIDE HYDROCHLORIDE AND TIMOLOL MALEATE 20; 5 MG/ML; MG/ML
SOLUTION/ DROPS OPHTHALMIC
COMMUNITY
Start: 2024-04-17

## 2025-03-03 NOTE — PROGRESS NOTES
[facility] [address] [place] [address]  Phone: [phone]  Fax: [phone]    Patient:  Marge Henry, Date of birth 12/10/1934  Date of Visit:  03/04/2025  Referring Provider No ref. provider found      Assessment & Plan        Atrial fibrillation, unspecified type (H)  Reviewed at length with daughter and patient, new onset asymptomatic and discussed risks of stroke, control of rate and prevent clot risk.  Patient and daughter agree with medication, declined referral to cardiology would not want procedure or shock given currently no symptoms.    Stop ACE, Start Ca channel blocker and DOAC and recheck in 2 weeks on medication.    - EKG 12-lead complete w/read - Clinics  - diltiazem ER (DILT-XR) 120 MG 24 hr capsule  Dispense: 90 capsule; Refill: 1  - rivaroxaban ANTICOAGULANT (XARELTO) 20 MG TABS tablet  Dispense: 90 tablet; Refill: 3    Postherpetic neuralgia  Ongoing use of gabapentin, consider again titration down dose    Frail elderly  Patient with 24 hour care of daughter and in single level home, appears stable    Irritable bowel syndrome with diarrhea  Discussed possibly starting and adding low dose fiber with low dose lomotil  - psyllium (METAMUCIL/KONSYL) capsule  Dispense: 90 capsule; Refill: 3    Essential hypertension, benign  Low BP (possibly afib related)  Stop current ACE and now trying diltiazem  - Basic metabolic panel  - Basic metabolic panel    Chronic primary angle-closure glaucoma of both eyes, unspecified glaucoma stage  Patient still on drops and sees ophthalmology    Age-related osteoporosis without current pathological fracture  Has been on Fosamax for 4+ Years discussed taking a holiday and discontinue this medication    Actinic keratosis  No signs of any skin changes    Need for vaccination against respiratory syncytial virus  Agrees and will go to pharmacy  - RSV vaccine, bivalent, ABRYSVO, injection  Dispense: 0.5 mL; Refill:  "0  The longitudinal plan of care for the diagnosis(es)/condition(s) as documented were addressed during this visit. Due to the added complexity in care, I will continue to support Marge in the subsequent management and with ongoing continuity of care.      57 minutes spent by me on the date of the encounter doing chart review, history and exam, documentation and further activities per the note       History of Present Illness     Pertinent history obtain from: patient and patient's child(ejre)    Diarrhea: seems to occur when going to events, seems nervous  Takes immodium half tab daily but sometimes  -has two or one bout daily  -never nocturnal bowel movements  -stool is sometimes solid but always related    Physical Exam     Vital signs:  BP 95/61   Pulse 72   Temp 98  F (36.7  C) (Oral)   Resp 20   Ht 1.51 m (4' 11.45\")   Wt 45.8 kg (101 lb)   SpO2 99%   BMI 20.09 kg/m      GENERAL: alert and no distress  EYES: Eyes grossly normal to inspection, PERRL and conjunctivae and sclerae normal  NECK: no adenopathy, no asymmetry, masses, or scars  RESP: lungs clear to auscultation - no rales, rhonchi or wheezes  CV: irregularly irregular rhythm, normal S1 S2, no S3 or S4, no murmur, click or rub, peripheral pulses strong, and no peripheral edema  ABDOMEN: soft, nontender, no hepatosplenomegaly, no masses and bowel sounds normal  MS: no gross musculoskeletal defects noted, no edema  SKIN: no suspicious lesions or rashes  NEURO: Normal strength and tone, mentation intact and speech normal  PSYCH: mentation appears normal, affect normal/bright    Data   Laboratory data and imaging listed below were reviewed as part of this encounter.                  "

## 2025-03-04 ENCOUNTER — OFFICE VISIT (OUTPATIENT)
Dept: FAMILY MEDICINE | Facility: CLINIC | Age: OVER 89
End: 2025-03-04
Payer: COMMERCIAL

## 2025-03-04 VITALS
DIASTOLIC BLOOD PRESSURE: 61 MMHG | HEIGHT: 59 IN | BODY MASS INDEX: 20.36 KG/M2 | RESPIRATION RATE: 20 BRPM | WEIGHT: 101 LBS | TEMPERATURE: 98 F | HEART RATE: 72 BPM | OXYGEN SATURATION: 99 % | SYSTOLIC BLOOD PRESSURE: 95 MMHG

## 2025-03-04 DIAGNOSIS — H40.2230 CHRONIC PRIMARY ANGLE-CLOSURE GLAUCOMA OF BOTH EYES, UNSPECIFIED GLAUCOMA STAGE: ICD-10-CM

## 2025-03-04 DIAGNOSIS — B02.29 POSTHERPETIC NEURALGIA: Primary | ICD-10-CM

## 2025-03-04 DIAGNOSIS — K58.0 IRRITABLE BOWEL SYNDROME WITH DIARRHEA: ICD-10-CM

## 2025-03-04 DIAGNOSIS — Z29.11 NEED FOR VACCINATION AGAINST RESPIRATORY SYNCYTIAL VIRUS: ICD-10-CM

## 2025-03-04 DIAGNOSIS — I10 ESSENTIAL HYPERTENSION, BENIGN: ICD-10-CM

## 2025-03-04 DIAGNOSIS — L57.0 ACTINIC KERATOSIS: ICD-10-CM

## 2025-03-04 DIAGNOSIS — M81.0 AGE-RELATED OSTEOPOROSIS WITHOUT CURRENT PATHOLOGICAL FRACTURE: ICD-10-CM

## 2025-03-04 DIAGNOSIS — R54 FRAIL ELDERLY: ICD-10-CM

## 2025-03-04 DIAGNOSIS — I48.91 ATRIAL FIBRILLATION, UNSPECIFIED TYPE (H): ICD-10-CM

## 2025-03-04 RX ORDER — LISINOPRIL 10 MG/1
10 TABLET ORAL DAILY
Qty: 90 TABLET | Refills: 3 | Status: SHIPPED | OUTPATIENT
Start: 2025-03-04 | End: 2025-03-04

## 2025-03-04 RX ORDER — ALENDRONATE SODIUM 70 MG/1
TABLET ORAL
Qty: 12 TABLET | Refills: 0 | Status: CANCELLED | OUTPATIENT
Start: 2025-03-04

## 2025-03-04 RX ORDER — DILTIAZEM HYDROCHLORIDE 120 MG/1
120 CAPSULE, EXTENDED RELEASE ORAL DAILY
Qty: 90 CAPSULE | Refills: 1 | Status: SHIPPED | OUTPATIENT
Start: 2025-03-04

## 2025-03-04 SDOH — HEALTH STABILITY: PHYSICAL HEALTH: ON AVERAGE, HOW MANY DAYS PER WEEK DO YOU ENGAGE IN MODERATE TO STRENUOUS EXERCISE (LIKE A BRISK WALK)?: 1 DAY

## 2025-03-04 ASSESSMENT — SOCIAL DETERMINANTS OF HEALTH (SDOH): HOW OFTEN DO YOU GET TOGETHER WITH FRIENDS OR RELATIVES?: ONCE A WEEK

## 2025-03-04 NOTE — PROGRESS NOTES
Preventive Care Visit  [facility]  Ursula Juan Quintanilla MD, Family Medicine  Mar 4, 2025  {Provider  Link to SmartSet :451854}      Elaine Bird is a 89 year old, presenting for the following:  No chief complaint on file.    {(!) Visit Details have not yet been documented.  Please enter Visit Details and then use this list to pull in documentation. (Optional):193511}  {ROOMER if patient is in their first year of Medicare a vision screen is required click here to document the Vison screen and then refresh the note to pull in results  :128921}      HPI  ***   {MA/LPN/RN Pre-Provider Visit Orders- hCG/UA/Strep (Optional):724119}  {SUPERLIST (Optional):481703}  {additonal problems for provider to add (Optional):336007}  Advance Care Planning  Patient does not have a Health Care Directive: {ADVANCE_DIRECTIVE_STATUS:627516}      3/4/2025   General Health   How would you rate your overall physical health? (!) FAIR   Feel stress (tense, anxious, or unable to sleep) To some extent   (!) STRESS CONCERN      3/4/2025   Nutrition   Diet: I don't know         3/4/2025   Exercise   Days per week of moderate/strenous exercise 1 day   (!) EXERCISE CONCERN      3/4/2025   Social Factors   Frequency of gathering with friends or relatives Once a week   Worry food won't last until get money to buy more No   Food not last or not have enough money for food? No   Do you have housing? (Housing is defined as stable permanent housing and does not include staying ouside in a car, in a tent, in an abandoned building, in an overnight shelter, or couch-surfing.) Yes   Are you worried about losing your housing? No   Lack of transportation? No   Unable to get utilities (heat,electricity)? Patient declined         3/4/2025   Fall Risk   Fallen 2 or more times in the past year? No   Trouble with walking or balance? No          3/4/2025   Activities of Daily Living- Home Safety   Needs help with the following  daily activites None of the above   Safety concerns in the home None of the above         3/4/2025   Dental   Dentist two times every year? (!) NO         3/4/2025   Hearing Screening   Hearing concerns? None of the above         3/4/2025   Driving Risk Screening   Patient/family members have concerns about driving (!) DECLINE         3/4/2025   General Alertness/Fatigue Screening   Have you been more tired than usual lately? (!) YES         3/4/2025   Urinary Incontinence Screening   Bothered by leaking urine in past 6 months Yes            Today's PHQ-2 Score:       3/4/2025    10:55 AM   PHQ-2 ( 1999 [employer])   Q1: Little interest or pleasure in doing things 0   Q2: Feeling down, depressed or hopeless 0   PHQ-2 Score 0    Q1: Little interest or pleasure in doing things Not at all   Q2: Feeling down, depressed or hopeless Not at all   PHQ-2 Score 0       Patient-reported           3/4/2025   Substance Use   Alcohol more than 3/day or more than 7/wk No   Do you have a current opioid prescription? No   How severe/bad is pain from 1 to 10? 1/10   Do you use any other substances recreationally? (!) DECLINE     Social History     Tobacco Use    Smoking status: Former    Smokeless tobacco: Never   Substance Use Topics    Alcohol use: Yes     Alcohol/week: 0.0 standard drinks of alcohol     Comment: 1-2 cans of beer/Wine Occasionally    Drug use: No     {Provider  If there are gaps in the social history shown above, please follow the link to update and then refresh the note Link to Social and Substance History :791844}     {Mammogram Decision Support (Optional):757911}      {Provider  REQUIRED FOR AWV Use the storyboard to review patient history, after sections have been marked as reviewed, refresh note to capture documentation:172455}  {Provider   REQUIRED AWV use this link to review and update sexual activity history  after section has been marked as reviewed, refresh note to capture documentation:476766}  Reviewed  "and updated as needed this visit by Provider                    {HISTORY OPTIONS (Optional):272796}  Current providers sharing in care for this patient include:  Patient Care Team:  Ursula Quintanilla MD as PCP - General (Family Practice)  Ursula Quintanilla MD as Assigned PCP    The following health maintenance items are reviewed in Epic and correct as of today:  Health Maintenance   Topic Date Due    ZOSTER IMMUNIZATION (1 of 2) Never done    RSV VACCINE (1 - 1-dose 75+ series) Never done    MAMMO SCREENING  01/16/2019    INFLUENZA VACCINE (1) 09/01/2024    COVID-19 Vaccine (6 - 2024-25 season) 09/01/2024    BMP  02/15/2025    MEDICARE ANNUAL WELLNESS VISIT  02/15/2025    FALL RISK ASSESSMENT  03/04/2026    DTAP/TDAP/TD IMMUNIZATION (3 - Td or Tdap) 07/19/2027    DEXA  10/03/2034    PHQ-2 (once per calendar year)  Completed    Pneumococcal Vaccine: 50+ Years  Completed    HPV IMMUNIZATION  Aged Out    MENINGITIS IMMUNIZATION  Aged Out    ADVANCE CARE PLANNING  Discontinued       {ROS Picklists (Optional):298258}     Objective    Exam  There were no vitals taken for this visit.   Estimated body mass index is 22.02 kg/m  as calculated from the following:    Height as of 2/15/24: 1.499 m (4' 11\").    Weight as of 2/15/24: 49.4 kg (109 lb).    Physical Exam  {Exam Choices (Optional):450701}  {Provider  The Mini-Cog is incomplete, use link to complete and refresh note Link to Mini-Cog :285509}      2/15/2024   Mini Cog   Clock Draw Score 0 Abnormal   3 Item Recall 3 objects recalled   Mini Cog Total Score 3     {A Mini-Cog total score of 0-2 suggests the possibility of dementia, score of 3-5 suggests no dementia:300770}         Signed Electronically by: Ursula Quintanilla MD  {Email feedback regarding this note to [email or web address]   :687051}  "

## 2025-03-05 LAB
ANION GAP SERPL CALCULATED.3IONS-SCNC: 10 MMOL/L (ref 7–15)
BUN SERPL-MCNC: 26.5 MG/DL (ref 8–23)
CALCIUM SERPL-MCNC: 10.2 MG/DL (ref 8.8–10.4)
CHLORIDE SERPL-SCNC: 104 MMOL/L (ref 98–107)
CREAT SERPL-MCNC: 1.11 MG/DL (ref 0.51–0.95)
EGFRCR SERPLBLD CKD-EPI 2021: 47 ML/MIN/1.73M2
GLUCOSE SERPL-MCNC: 90 MG/DL (ref 70–99)
HCO3 SERPL-SCNC: 24 MMOL/L (ref 22–29)
POTASSIUM SERPL-SCNC: 4.9 MMOL/L (ref 3.4–5.3)
SODIUM SERPL-SCNC: 138 MMOL/L (ref 135–145)

## 2025-03-07 NOTE — RESULT ENCOUNTER NOTE
Indication:Irregular rhythm    Interpretation: atrial fibrillation, rate 100, low voltage, no previous comparisons    Patient informed at visit.

## 2025-03-27 ENCOUNTER — TELEPHONE (OUTPATIENT)
Dept: ANTICOAGULATION | Facility: CLINIC | Age: OVER 89
End: 2025-03-27
Payer: COMMERCIAL

## 2025-03-27 DIAGNOSIS — I48.91 ATRIAL FIBRILLATION, UNSPECIFIED TYPE (H): ICD-10-CM

## 2025-03-27 NOTE — TELEPHONE ENCOUNTER
ANTICOAGULATION DIRECT ORAL ANTICOAGULANT MONITORING    SUBJECTIVE     The [facility] is evaluating Marge Henry's Rivaroxaban (Xarelto) as part of its Anticoagulation Monitoring Program.    Indication:Atrial Fibrillation  Current dose per medication list: Rivaroxaban 20 mg Daily  Recent hospitalizations/ED/Office Visits for bleeding/clotting concerns: No  Other bleeding or side effect concerns: No  Additional findings: None    OBJECTIVE     Age: 89 year old    Adherence score:0 as of     Wt Readings from Last 2 Encounters:   03/04/25 45.8 kg (101 lb)   02/15/24 49.4 kg (109 lb)      Lab Results   Component Value Date    CR 1.11 (H) 03/04/2025    CR 0.84 02/15/2024    CR 0.7 09/15/2020     Creatinine Clearance (using actual bodyweight, mL/min): 24    Lab Results   Component Value Date    HGB 11.7 02/15/2024    HGB 12.4 03/02/2016     02/15/2024       ASSESSMENT/PLAN     A chart review for Direct Oral Anticoagulant (DOAC) Stewardship has been completed for:     Dosing: recommend adjustment to Rivaroxaban 15 mg Daily for age >= 80 years, weight <= 60 kg, and CrCl <= 50 mL/min (using actual bodyweight) (consistent with package insert dosing)    Plan made per [facility] anticoagulation protocol    Katrin Trejo RN  Anticoagulation Clinic

## 2025-03-27 NOTE — TELEPHONE ENCOUNTER
ANTICOAGULATION  STEWARDSHIP    Spoke with  Marge and daughter Margaux  to review advised dosage change for Rivaroxaban (Xarelto).    Education provided: how to take rivaroxaban (Xarelto) safety: take dose with evening meal (or largest meal of day); at the same time each day, may finish the supply of 20 mg tablets that you have at home before starting the new dose. , what to do in the event of a missed dose, not discontinue therapy without talking to their provider first, may take longer than usual for bleeding to stop and patient may bruise or bleed more easily; any unusual bleeding to their provider. , and notifying provider of surgeries or procedures 1-2 weeks in advance    They verbalized understanding of new Rivaroxaban (Xarelto) dose/tablet strength  They were notified Rx for new dosage would be sent to preferred pharmacy    Katrin Trejo RN  [facility]

## 2025-03-29 ENCOUNTER — HEALTH MAINTENANCE LETTER (OUTPATIENT)
Age: OVER 89
End: 2025-03-29

## 2025-04-03 ENCOUNTER — OFFICE VISIT (OUTPATIENT)
Dept: FAMILY MEDICINE | Facility: CLINIC | Age: OVER 89
End: 2025-04-03
Payer: COMMERCIAL

## 2025-04-03 VITALS
HEIGHT: 59 IN | WEIGHT: 103 LBS | SYSTOLIC BLOOD PRESSURE: 121 MMHG | RESPIRATION RATE: 20 BRPM | DIASTOLIC BLOOD PRESSURE: 77 MMHG | TEMPERATURE: 97.1 F | BODY MASS INDEX: 20.76 KG/M2 | HEART RATE: 79 BPM | OXYGEN SATURATION: 93 %

## 2025-04-03 DIAGNOSIS — I48.91 ATRIAL FIBRILLATION, UNSPECIFIED TYPE (H): Primary | ICD-10-CM

## 2025-04-03 DIAGNOSIS — Z29.11 NEED FOR VACCINATION AGAINST RESPIRATORY SYNCYTIAL VIRUS: ICD-10-CM

## 2025-04-03 DIAGNOSIS — K58.0 IRRITABLE BOWEL SYNDROME WITH DIARRHEA: ICD-10-CM

## 2025-04-03 NOTE — PROGRESS NOTES
"  Assessment & Plan     (I48.91) Atrial fibrillation, unspecified type (H)  (primary encounter diagnosis)  Comment: patient and daugher note a cheaper alternative  Plan: apixaban ANTICOAGULANT (ELIQUIS) 2.5 MG tablet        Switch over    (Z29.11) Need for vaccination against respiratory syncytial virus  Comment: agrees  Plan: placed pharmacy order    (K58.0) Irritable bowel syndrome with diarrhea  Comment: increase metamucil to 3 capsules daily ok for prn immodium      Plan: no significant change  The longitudinal plan of care for the diagnosis(es)/condition(s) as documented were addressed during this visit. Due to the added complexity in care, I will continue to support Marge in the subsequent management and with ongoing continuity of care.        No follow-ups on file.    Subjective   Marge is a 89 year old, presenting for the following health issues:  Recheck Medication (If she can take a different one for insurance coverage )    HPI        Atrial Fibrillation Follow-up    Symptoms: no recent chest pain, significant palpitations, dizziness/lightheadedness, dyspnea, or increased peripheral edema. and no palpitations  Stroke prevention: DOAC (Eliquis, Xarelto, Pradaxa)        9/14/2021     4:19 PM 11/10/2022     3:05 PM 2/15/2024     3:46 PM 3/4/2025    11:03 AM 4/3/2025     1:14 PM   Date   Pulse 68 84 69 72 79     Current    '  How many days per week do you miss taking your medication? 0      Diarrhea: improved slightly with metamucil, still some loose and urgency, taking prn loperimide        Objective    /77 (BP Location: Right arm, Patient Position: Sitting)   Pulse 79   Temp 97.1  F (36.2  C) (Tympanic)   Resp 20   Ht 1.51 m (4' 11.45\")   Wt 46.7 kg (103 lb)   SpO2 93%   BMI 20.49 kg/m    Body mass index is 20.49 kg/m .  Physical Exam   GENERAL: alert and no distress  RESP: lungs clear to auscultation - no rales, rhonchi or wheezes  CV: regular rate and rhythm, normal S1 S2, no S3 or S4, " no no obvious irregularity murmur, click or rub, no peripheral edema  MS: no gross musculoskeletal defects noted, no edema            Signed Electronically by: Ursula Quintanilla MD

## 2025-05-01 ENCOUNTER — APPOINTMENT (OUTPATIENT)
Dept: CT IMAGING | Facility: HOSPITAL | Age: OVER 89
End: 2025-05-01
Attending: STUDENT IN AN ORGANIZED HEALTH CARE EDUCATION/TRAINING PROGRAM
Payer: COMMERCIAL

## 2025-05-01 ENCOUNTER — TELEPHONE (OUTPATIENT)
Dept: FAMILY MEDICINE | Facility: CLINIC | Age: OVER 89
End: 2025-05-01

## 2025-05-01 ENCOUNTER — HOSPITAL ENCOUNTER (EMERGENCY)
Facility: HOSPITAL | Age: OVER 89
Discharge: HOME | End: 2025-05-01
Attending: STUDENT IN AN ORGANIZED HEALTH CARE EDUCATION/TRAINING PROGRAM
Payer: COMMERCIAL

## 2025-05-01 ENCOUNTER — APPOINTMENT (OUTPATIENT)
Dept: RADIOLOGY | Facility: HOSPITAL | Age: OVER 89
End: 2025-05-01
Attending: STUDENT IN AN ORGANIZED HEALTH CARE EDUCATION/TRAINING PROGRAM
Payer: COMMERCIAL

## 2025-05-01 VITALS
RESPIRATION RATE: 18 BRPM | OXYGEN SATURATION: 97 % | SYSTOLIC BLOOD PRESSURE: 181 MMHG | DIASTOLIC BLOOD PRESSURE: 79 MMHG | BODY MASS INDEX: 21.87 KG/M2 | HEART RATE: 91 BPM | WEIGHT: 104.2 LBS | TEMPERATURE: 97.5 F | HEIGHT: 58 IN

## 2025-05-01 DIAGNOSIS — M25.522 LEFT ELBOW PAIN: ICD-10-CM

## 2025-05-01 DIAGNOSIS — S00.03XA SCALP HEMATOMA, INITIAL ENCOUNTER: ICD-10-CM
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
## 2025-05-01 DIAGNOSIS — M25.561 ACUTE PAIN OF RIGHT KNEE: ICD-10-CM
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
## 2025-05-01 PROCEDURE — 99284 EMERGENCY DEPT VISIT MOD MDM: CPT | Mod: 25

## 2025-05-01 PROCEDURE — 73080 X-RAY EXAM OF ELBOW: CPT | Mod: LT

## 2025-05-01 PROCEDURE — 73560 X-RAY EXAM OF KNEE 1 OR 2: CPT | Mod: RT

## 2025-05-01 PROCEDURE — 70450 CT HEAD/BRAIN W/O DYE: CPT

## 2025-05-01 PROCEDURE — 72125 CT NECK SPINE W/O DYE: CPT

## 2025-05-01 ASSESSMENT — ACTIVITIES OF DAILY LIVING (ADL): ADLS_ACUITY_SCORE: 41

## 2025-05-01 ASSESSMENT — COLUMBIA-SUICIDE SEVERITY RATING SCALE - C-SSRS
1. IN THE PAST MONTH, HAVE YOU WISHED YOU WERE DEAD OR WISHED YOU COULD GO TO SLEEP AND NOT WAKE UP?: NO
6. HAVE YOU EVER DONE ANYTHING, STARTED TO DO ANYTHING, OR PREPARED TO DO ANYTHING TO END YOUR LIFE?: NO
2. HAVE YOU ACTUALLY HAD ANY THOUGHTS OF KILLING YOURSELF IN THE PAST MONTH?: NO

## 2025-05-01 NOTE — ED TRIAGE NOTES
Pt. Is on blood thinners, fell and hit head tonight. Swelling and bruised area to the left forehead. Stated that she is generally unsteady on her feet and she just lost her balance.       Triage Assessment (Adult)       Row Name 05/01/25 0311          Triage Assessment    Airway WDL WDL        Respiratory WDL    Respiratory WDL WDL        Skin Circulation/Temperature WDL    Skin Circulation/Temperature WDL WDL        Cardiac WDL    Cardiac WDL WDL        Peripheral/Neurovascular WDL    Peripheral Neurovascular WDL WDL        Cognitive/Neuro/Behavioral WDL    Cognitive/Neuro/Behavioral WDL WDL

## 2025-05-01 NOTE — ED TRIAGE NOTES
Triage Assessment (Adult)       Row Name 05/01/25 0311          Triage Assessment    Airway WDL WDL        Respiratory WDL    Respiratory WDL WDL        Skin Circulation/Temperature WDL    Skin Circulation/Temperature WDL WDL        Cardiac WDL    Cardiac WDL WDL        Peripheral/Neurovascular WDL    Peripheral Neurovascular WDL WDL        Cognitive/Neuro/Behavioral WDL    Cognitive/Neuro/Behavioral WDL WDL                      Cathi was notified, please see results encounter.

## 2025-05-01 NOTE — DISCHARGE INSTRUCTIONS
Your imaging was reassuring did not show evidence of fracture or bleeding.    If you develop any headache, use Tylenol for this.    Please return to the ER if symptoms worsen, if you develop numbness/weakness on one side of your body, chest pain, shortness of breath, lightheadedness or passing out.

## 2025-05-01 NOTE — TELEPHONE ENCOUNTER
Clinic page  05/01/25  2:13 AM    Page regarding fall  Patient is a 89 year old female with hx of Afib on Eliquis    Patient fell and hit her head from standign position, sounds mechanical as she was trying to sit down on the bed but missed the bed and fell backwards, used knee to break fall but did hit her head quite hard  Has large bump on head and knee  Bump on head small-margret but definitely bruised  Is on Xarelto 15mg daily  Did not lose consciousness  Can stand, is otherwise lucid at this time      Based on level of trauma, age, and being on Xarelto, recommend immediate evaluation in ER  Patient's daughter expresses understanding, will take patient in for further evaluation  Note routed to PCP per patient and family request as DEYSI Infante MD  [facility]

## 2025-05-01 NOTE — ED PROVIDER NOTES
EMERGENCY DEPARTMENT ENCOUNTER      NAME: Marge Henry  AGE: 89 year old female  YOB: 1934  MRN: [id number]  EVALUATION DATE & TIME: 5/1/2025  3:12 AM    PCP: Ursula Quintanilla    ED PROVIDER: Izaiah Blankenship MD      Chief Complaint   Patient presents with    Fall         FINAL IMPRESSION:  1. Fall at home, initial encounter    2. Scalp hematoma, initial encounter    3. Left elbow pain    4. Acute pain of right knee          ED COURSE & MEDICAL DECISION MAKING:    Pertinent Labs & Imaging studies reviewed. (See chart for details)  89 year old female presents to the Emergency Department for evaluation of fall on Eliquis    ED Course as of 05/01/25 0435   Thu May 01, 2025   0326 Patient is a 89-year-old female who is on Eliquis and presents to the emergency department after a mechanical ground-level fall.  She got up to go to the bathroom in the middle of the night, went to sit on her bed, and missed the edge of the bed, falling and striking her head, right knee, left elbow.  She has bruising to her left frontal scalp and has a hematoma on the crown of her scalp.  She has bruising to her right superior/anterior knee.  She has tenderness of the right knee and left elbow, but no limitation range of motion.  She does not want anything for pain at this time.  She did not lose consciousness, does not have nausea/vomiting.  There was no preceding symptoms that would suggest this was syncopal or cardiac in nature, so I do not feel that labs are indicated   0421 Plain films of the right knee and of the left elbow negative for acute traumatic pathology.   0432 CT head and C spine negative for acute traumatic pathology.   0432 Patient has ambulated well. Will discharge at this time with return precautions.       Medical Decision Making  I obtained history from Family Member/Significant Other  Care impacted by Anticoagulated State  I independently interpreted the CT head and C-spine, XR of R knee and L  elbow and note no acute traumatic pathology. See radiology report for final interpretation.  Discharge. No recommendations on prescription strength medication(s). See documentation for any additional details.    MIPS (CTPE, Dental pain, Shi, Sinusitis, Asthma/COPD, Head Trauma): Adult Minor Head Trauma:Currently taking anticoagulant medications: warfarin or other novel anticoagulant medications    SEPSIS: None            At the conclusion of the encounter I discussed the results of all of the tests and the disposition. The questions were answered. The patient or family acknowledged understanding and was agreeable with the care plan.     0 minutes of critical care time     MEDICATIONS GIVEN IN THE EMERGENCY:  Medications - No data to display    NEW PRESCRIPTIONS STARTED AT TODAY'S ER VISIT  New Prescriptions    No medications on file          =================================================================    HPI    Patient information was obtained from: patient, daughters    Use of : N/A        Marge JOSI Henry is a 89 year old female with a pertinent history of HTN, atrial fibrillation (on Eliquis) who presents to this ED for evaluation of fall.  Patient got up to go to the bathroom in the middle the night, using her walker as she usually does, and when she got back to bed she went to sit down on the edge, but missed the bed, causing her to fall onto her right knee and left elbow and striking her head on the door.  She did not lose consciousness, has no significant headache, and does not have nausea or vomiting.  She last took her Eliquis in the evening after dinner.  There was no preceding chest pain, lightheadedness, passing out.  She has mild pain in her left elbow and right knee.      PAST MEDICAL HISTORY:  Past Medical History:   Diagnosis Date    Essential hypertension, benign 01/02/2013    Irritable bowel syndrome 01/02/2013    Osteoarthrosis, unspecified whether generalized or localized,  pelvic region and thigh 01/02/2013    Primary angle-closure glaucoma, unspecified(365.20) 01/02/2013    Shingles 10/07/2013       PAST SURGICAL HISTORY:  Past Surgical History:   Procedure Laterality Date    ARTHROPLASTY HIP BILATERAL  2007    2 months apart    BIOPSY BREAST      pt states she had a couple; doesn't remember    BREAST BIOPSY, RT/LT  1990s    benign bilateral    ZZC TOTAL KNEE ARTHROPLASTY Right 3/9/2016    Procedure: RIGHT KNEE TOTAL ARTHROPLASTY;  Surgeon: Ruddy Dos Santos MD;  Location: [facility] OR;  Service: Orthopedics           CURRENT MEDICATIONS:    ACETAMINOPHEN  apixaban ANTICOAGULANT (ELIQUIS) 2.5 MG tablet  Calcium Carbonate (CALTRATE 600 PO)  diltiazem ER (DILT-XR) 120 MG 24 hr capsule  dorzolamide-timolol (COSOPT) 2-0.5 % ophthalmic solution  gabapentin (NEURONTIN) 300 MG capsule  imiquimod (ALDARA) 5 % external cream  lidocaine (LIDODERM) 5 % patch  loperamide (IMODIUM A-D) 2 MG tablet  multivitamin (CENTRUM SILVER) tablet  multivitamin (THERMEMS) TABS  psyllium (METAMUCIL/KONSYL) capsule  SIMBRINZA 1-0.2 % ophthalmic suspension  timolol maleate (TIMOPTIC) 0.5 % ophthalmic solution  Vitamin D, Cholecalciferol, 10 MCG (400 UNIT) TABS        ALLERGIES:  No Known Allergies    FAMILY HISTORY:  Family History   Problem Relation Age of Onset    Cancer No family hx of     Diabetes No family hx of     Heart Disease No family hx of     Coronary Artery Disease No family hx of        SOCIAL HISTORY:   Social History     Socioeconomic History    Marital status:    Tobacco Use    Smoking status: Former    Smokeless tobacco: Never   Substance and Sexual Activity    Alcohol use: Yes     Alcohol/week: 0.0 standard drinks of alcohol     Comment: 1-2 cans of beer/Wine Occasionally    Drug use: No   Social History Narrative    Lives with daughter and has daughter next door, has only 3 steps into house then all one level, has walker at home.     Social Drivers of Health     Financial  "Resource Strain: Unknown (3/4/2025)    Financial Resource Strain     Within the past 12 months, have you or your family members you live with been unable to get utilities (heat, electricity) when it was really needed?: Patient declined   Food Insecurity: Low Risk  (3/4/2025)    Food Insecurity     Within the past 12 months, did you worry that your food would run out before you got money to buy more?: No     Within the past 12 months, did the food you bought just not last and you didn t have money to get more?: No   Transportation Needs: Low Risk  (3/4/2025)    Transportation Needs     Within the past 12 months, has lack of transportation kept you from medical appointments, getting your medicines, non-medical meetings or appointments, work, or from getting things that you need?: No   Physical Activity: Unknown (3/4/2025)    Exercise Vital Sign     Days of Exercise per Week: 1 day   Stress: Stress Concern Present (3/4/2025)    [place] [employer] - Occupational Stress Questionnaire     Feeling of Stress : To some extent   Social Connections: Unknown (3/4/2025)    Social Connection and Isolation Panel [NHANES]     Frequency of Social Gatherings with Friends and Family: Once a week   Interpersonal Safety: Low Risk  (3/4/2025)    Interpersonal Safety     Do you feel physically and emotionally safe where you currently live?: Yes     Within the past 12 months, have you been hit, slapped, kicked or otherwise physically hurt by someone?: No     Within the past 12 months, have you been humiliated or emotionally abused in other ways by your partner or ex-partner?: No   Housing Stability: Low Risk  (3/4/2025)    Housing Stability     Do you have housing? : Yes     Are you worried about losing your housing?: No       VITALS:  BP (!) 181/79   Pulse 91   Temp 97.5  F (36.4  C) (Oral)   Resp 18   Ht 1.473 m (4' 10\")   Wt 47.3 kg (104 lb 3.2 oz)   SpO2 97%   BMI 21.78 kg/m      PHYSICAL EXAM    Physical " Exam  Vitals and nursing note reviewed.   Constitutional:       General: She is not in acute distress.     Appearance: Normal appearance. She is normal weight. She is not ill-appearing.   HENT:      Head: Normocephalic.      Comments: Hematoma to the crown of the scalp, bruising at the left frontal scalp.  No instability of the skull or midface.     Nose: Nose normal.      Mouth/Throat:      Mouth: Mucous membranes are moist.      Pharynx: Oropharynx is clear.   Eyes:      Extraocular Movements: Extraocular movements intact.      Conjunctiva/sclera: Conjunctivae normal.      Pupils: Pupils are equal, round, and reactive to light.   Cardiovascular:      Rate and Rhythm: Normal rate.      Pulses: Normal pulses.      Heart sounds: Normal heart sounds. No murmur heard.  Pulmonary:      Effort: Pulmonary effort is normal. No respiratory distress.      Breath sounds: Normal breath sounds.   Chest:      Chest wall: No tenderness.   Abdominal:      General: Abdomen is flat. There is no distension.      Palpations: Abdomen is soft.      Tenderness: There is no abdominal tenderness.      Comments: Pelvis stable, non-tender   Musculoskeletal:         General: Tenderness (L elbow) present. Normal range of motion.      Cervical back: Normal range of motion and neck supple. No rigidity or tenderness.      Right lower leg: No edema.      Left lower leg: No edema.   Skin:     General: Skin is warm and dry.      Capillary Refill: Capillary refill takes less than 2 seconds.      Findings: Bruising (R knee) present.   Neurological:      General: No focal deficit present.      Mental Status: She is alert and oriented to person, place, and time. Mental status is at baseline.   Psychiatric:         Mood and Affect: Mood normal.         Behavior: Behavior normal.         Thought Content: Thought content normal.         Judgment: Judgment normal.            LAB:  All pertinent labs reviewed and interpreted.  Results for orders placed or  performed during the hospital encounter of 05/01/25   CT Head w/o Contrast    Impression    IMPRESSION:  HEAD CT:  1.  No CT evidence for acute intracranial process.  2.  Brain atrophy and presumed chronic microvascular ischemic changes as above.  3.  Scalp hematoma over the vertex more pronounced to the right and mild left frontal scalp swelling.    CERVICAL SPINE CT:  1.  No CT evidence for acute fracture or post traumatic subluxation.  2.  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  3.  Degenerative changes with central canal and foraminal stenosis as described above.     CT Cervical Spine w/o Contrast    Impression    IMPRESSION:  HEAD CT:  1.  No CT evidence for acute intracranial process.  2.  Brain atrophy and presumed chronic microvascular ischemic changes as above.  3.  Scalp hematoma over the vertex more pronounced to the right and mild left frontal scalp swelling.    CERVICAL SPINE CT:  1.  No CT evidence for acute fracture or post traumatic subluxation.  2.  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  3.  Degenerative changes with central canal and foraminal stenosis as described above.     Elbow  XR, G/E 3 views, left    Impression    IMPRESSION: Normal joint spaces and alignment. No fracture or joint effusion.   XR Knee Right 1/2 Views    Impression    IMPRESSION: Right TKA with patellar resurfacing. Negative for fracture or dislocation. No definite knee effusion.                       RADIOLOGY:  Reviewed all pertinent imaging. Please see official radiology report.  CT Cervical Spine w/o Contrast   Final Result   IMPRESSION:   HEAD CT:   1.  No CT evidence for acute intracranial process.   2.  Brain atrophy and presumed chronic microvascular ischemic changes as above.   3.  Scalp  hematoma over the vertex more pronounced to the right and mild left frontal scalp swelling.      CERVICAL SPINE CT:   1.  No CT evidence for acute fracture or post traumatic subluxation.   2.  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   3.  Degenerative changes with central canal and foraminal stenosis as described above.         CT Head w/o Contrast   Final Result   IMPRESSION:   HEAD CT:   1.  No CT evidence for acute intracranial process.   2.  Brain atrophy and presumed chronic microvascular ischemic changes as above.   3.  Scalp hematoma over the vertex more pronounced to the right and mild left frontal scalp swelling.      CERVICAL SPINE CT:   1.  No CT evidence for acute fracture or post traumatic subluxation.   2.  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   3.  Degenerative changes with central canal and foraminal stenosis as described above.         XR Knee Right 1/2 Views   Final Result   IMPRESSION: Right TKA with patellar resurfacing. Negative for fracture or dislocation. No definite knee effusion.                            Elbow  XR, G/E 3 views, left   Final Result   IMPRESSION: Normal joint spaces and alignment. No fracture or joint effusion.          PROCEDURES:   None      [facility] Documentation:   CMS Diagnoses: None             Izaiah Blankenship MD  [facility] EMERGENCY DEPARTMENT  [address] [address] [phone]       Izaiah Blankenship MD  05/01/25 6645

## 2025-05-06 ENCOUNTER — VIRTUAL VISIT (OUTPATIENT)
Dept: FAMILY MEDICINE | Facility: CLINIC | Age: OVER 89
End: 2025-05-06
Payer: COMMERCIAL
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
## 2025-05-06 DIAGNOSIS — S09.90XA TRAUMATIC INJURY OF HEAD WITH HEMATOMA OF SCALP: ICD-10-CM

## 2025-05-06 DIAGNOSIS — I48.91 ATRIAL FIBRILLATION, UNSPECIFIED TYPE (H): ICD-10-CM

## 2025-05-06 DIAGNOSIS — S00.03XA TRAUMATIC INJURY OF HEAD WITH HEMATOMA OF SCALP: ICD-10-CM

## 2025-05-06 NOTE — PROGRESS NOTES
"Marge is a 89 year old who is being evaluated via a billable telephone visit.    What phone number would you like to be contacted at? [phone]  How would you like to obtain your AVS? [employer]  Originating Location (pt. Location): Home    Distant Location (provider location):  On-site  Telephone visit completed due to the patient did not have access to video, while the distant provider did.    Assessment & Plan     (W19.XXXD) Fall, subsequent encounter  (primary encounter diagnosis)  Comment: patient with 24 hour care with daughters, has walker, still alert and oriented and getting around  Plan: continue close supervision    (S09.90XA,  S00.03XA) Traumatic injury of head with hematoma of scalp  Comment: reviewed photo image sent via [employer]  Plan: discussed continue with icing the hematoma area, that this all sounds consistent with resolving hematoma, nothing is enlarging just \"drifting\" down the face     (I48.91) Atrial fibrillation, unspecified type (H)  Comment: will continue on blood thinner at this time  Plan: good question and discussed that if having more falls with more significant bruising could certainly discontinue.      Repeat photo of face in one week    Subjective   Marge is a 89 year old, presenting for the following health issues:  discuss (Discuss about the fall on 5/1/2025 and went to the hospital. Bump of the head and forehead. Pt daughter stated that pt is feeling sore and achy in the body. )    HPI      Fell 5/1/2025  -has a knot still on her head and on forehead, raised still about 1/4\" but began almost size of the palm so it is getting smaller  -noticing that patient still \"hurts\"  elbow, knee and whole body just feels sore  -more bruising noting down lower on the face    -today walked with walker, went to kitchen and gets around in the house  -sore on the elbow and sees bruising  -staying on tylenol    2. WONDER ABOUT ANTICOAG  -she is staying on the blood thinner  Not aware of any " "internal bleeding but just sees all the bruising and                 Objective    Vitals - Patient Reported  Systolic (Patient Reported):  (pt doesn't have cuff)  Diastolic (Patient Reported):  (pt doesn't have cuff)  Weight (Patient Reported): 47.2 kg (104 lb)  Height (Patient Reported): 142.2 cm (4' 8\")  BMI (Based on Pt Reported Ht/Wt): 23.32  SpO2 (Patient Reported):  (pt doesn't have anything to check O2)  Temperature (Patient Reported):  (pt doesn't have anything to check temperature)  Pulse (Patient Reported):  (pt doesn't have anything to check pulse)      Vitals:  No vitals were obtained today due to virtual visit.    Physical Exam   General: Alert and no distress //Respiratory: No audible wheeze, cough, or shortness of breath // Psychiatric:  Appropriate affect, tone, and pace of words                Phone call duration: 22 minutes  Signed Electronically by: Ursula Quintanilla MD    "

## 2025-05-06 NOTE — PROGRESS NOTES
"Family Medicine Telephone Visit Note                   Chief Complaint   Patient presents with    discuss     Discuss about the fall on 5/1/2025 and went to the hospital. Bump of the head and forehead. Pt daughter stated that pt is feeling sore and achy in the body.        {If Provider starts visit, do consent and meds as above using \"VIRTUAL tab.\" For calls - Use [employer] nita to use their \"\" function to call without revealing your cell phone # and show your clinics phone number. Or use *67 before dialing the 10 digit #. DELETE THIS TEXT.}              HPI   Patients name: Marge  Appointment start time:  { :[id number]}    {Superlists ():325712}      Current Outpatient Medications   Medication Sig Dispense Refill    ACETAMINOPHEN 2 tablets. In the morning and before bed as needed.      apixaban ANTICOAGULANT (ELIQUIS) 2.5 MG tablet Take 1 tablet (2.5 mg) by mouth 2 times daily. 180 tablet 3    Calcium Carbonate (CALTRATE 600 PO)       diltiazem ER (DILT-XR) 120 MG 24 hr capsule Take 1 capsule (120 mg) by mouth daily. 90 capsule 1    dorzolamide-timolol (COSOPT) 2-0.5 % ophthalmic solution       gabapentin (NEURONTIN) 300 MG capsule Take 2 capsules (600 mg) by mouth at bedtime. May also take 1 capsule (300 mg) daily as needed for neuropathic pain. 270 capsule 3    imiquimod (ALDARA) 5 % external cream Apply topically twice weekly at bedtime to face or scalp (but not both) and wash off after 8 hours. May use for up to 16 weeks. 8 packet 3    lidocaine (LIDODERM) 5 % patch Place 1 patch onto the skin every 24 hours Apply to painful area at once for up to 12 h within a 24 h period. 30 patch 11    loperamide (IMODIUM A-D) 2 MG tablet Take 2 mg by mouth daily as needed       multivitamin (CENTRUM SILVER) tablet Take 1 tablet by mouth daily      multivitamin (THERMEMS) TABS Take 1 tablet by mouth      psyllium (METAMUCIL/KONSYL) capsule Take 1 capsule by mouth daily. 90 capsule 3    SIMBRINZA 1-0.2 % ophthalmic " "suspension INSTILL ONE DROP INTO LEFT EYE TWICE DAILY      timolol maleate (TIMOPTIC) 0.5 % ophthalmic solution Apply 1 drop Left Eye twice a day      Vitamin D, Cholecalciferol, 10 MCG (400 UNIT) TABS Take 400 mg by mouth daily       No Known Allergies           Review of Systems:     {ROS COMP (Optional):845378}         Physical Exam:     There were no vitals taken for this visit.  Estimated body mass index is 21.78 kg/m  as calculated from the following:    Height as of 5/1/25: 1.473 m (4' 10\").    Weight as of 5/1/25: 47.3 kg (104 lb 3.2 oz).    Exam:  Constitutional: {:185252}  Psychiatric: {:449561}    {Result Choices:146930}        Assessment and Plan   {Diag Picklist:079153}    Refilled medications that would be required in the next 3 months.     After Visit Information:  {avs options:446964}    No follow-ups on file.    Appointment end time: { :3311411}  This is a telephone visit that took *** minutes.      Clinician location:  [facility]     Ursula Juan Quintanilla MD  I precepted today with ***.    {Bill telephone visit time codes. Coding will change to an E&M code if the patient's insurance allows for this.  However, documentation should support E&M code billing.  53518: 5-10 min  01989: 11-20 min  97624: 21-30 min}      {[employer] Language [employer] Access (Help text- F2 to highlight then hit delete)    Dial [phone]    Answer  Welcome to the Language Line Services.  Please enter your six-digit client ID.    [place] enter [id number]  [place] enter [phone]  [facility] enter [phone]  [place] enter [phone]    Answer  \"For Hebrew, press 1.  For all other languages, press 2.\"   they will ask you to say the name of the language.    Press either 1 (yes, correct) or 2 (no, incorrect).}   "

## 2025-06-04 DIAGNOSIS — B02.29 NEURALGIA, POSTHERPETIC: ICD-10-CM

## 2025-06-04 RX ORDER — GABAPENTIN 300 MG/1
CAPSULE ORAL
Qty: 180 CAPSULE | Refills: 1 | Status: SHIPPED | OUTPATIENT
Start: 2025-06-04

## 2025-08-11 DIAGNOSIS — I48.91 ATRIAL FIBRILLATION, UNSPECIFIED TYPE (H): ICD-10-CM

## 2025-08-11 RX ORDER — DILTIAZEM HYDROCHLORIDE 120 MG/1
120 CAPSULE, EXTENDED RELEASE ORAL DAILY
Qty: 90 CAPSULE | Refills: 0 | Status: SHIPPED | OUTPATIENT
Start: 2025-08-11